# Patient Record
Sex: MALE | Race: WHITE | NOT HISPANIC OR LATINO | Employment: FULL TIME | URBAN - METROPOLITAN AREA
[De-identification: names, ages, dates, MRNs, and addresses within clinical notes are randomized per-mention and may not be internally consistent; named-entity substitution may affect disease eponyms.]

---

## 2017-03-23 ENCOUNTER — OFFICE VISIT (OUTPATIENT)
Dept: AUDIOLOGY | Facility: CLINIC | Age: 27
End: 2017-03-23
Payer: MEDICAID

## 2017-03-23 DIAGNOSIS — H90.72 MIXED HEARING LOSS OF LEFT EAR: ICD-10-CM

## 2017-03-23 DIAGNOSIS — H90.11 CONDUCTIVE HEARING LOSS IN RIGHT EAR: Primary | ICD-10-CM

## 2017-03-23 PROCEDURE — 92567 TYMPANOMETRY: CPT | Performed by: AUDIOLOGIST

## 2017-03-23 PROCEDURE — 92557 COMPREHENSIVE HEARING TEST: CPT | Performed by: AUDIOLOGIST

## 2017-03-23 NOTE — MR AVS SNAPSHOT
"              After Visit Summary   3/23/2017    Mr. Todd Delgado    MRN: [id number]           Patient Information     Date Of Birth          1990        Visit Information        Provider Department      3/23/2017 9:30 AM Romina Luna AuD [facility]        Today's Diagnoses     Conductive hearing loss in right ear    -  1    Mixed hearing loss of left ear           Follow-ups after your visit        Your next 10 appointments already scheduled     Apr 03, 2017  2:15 PM CDT   New Visit with Osmani Valdez MD   [facility] ([facility])    [address] [address]   [phone]              Who to contact     If you have questions or need follow up information about today's clinic visit or your schedule please contact [facility] directly at [phone].  Normal or non-critical lab and imaging results will be communicated to you by MyChart, letter or phone within 4 business days after the clinic has received the results. If you do not hear from us within 7 days, please contact the clinic through MyChart or phone. If you have a critical or abnormal lab result, we will notify you by phone as soon as possible.  Submit refill requests through [employer] or call your pharmacy and they will forward the refill request to us. Please allow 3 business days for your refill to be completed.          Additional Information About Your Visit        MyChart Information     Eldarion lets you send messages to your doctor, view your test results, renew your prescriptions, schedule appointments and more. To sign up, go to [email or web address] . Click on \"Log in\" on the left side of the screen, which will take you to the Welcome page. Then click on \"Sign up Now\" on the right side of the page.     You will be asked to enter the access code listed below, as well as some personal information. Please follow the directions to create your username and password.   "   Your access code is: [email or web address]  Expires: 2017  2:59 PM     Your access code will  in 90 days. If you need help or a new code, please call your [facility] or [phone].        Care EveryWhere ID     This is your Care EveryWhere ID. This could be used by other organizations to access your [place] medical records  [id number]         Blood Pressure from Last 3 Encounters:   16 109/55    Weight from Last 3 Encounters:   No data found for Wt              We Performed the Following     AUDIOGRAM/TYMPANOGRAM - INTERFACE     COMPREHENSIVE HEARING TEST     TYMPANOMETRY        Primary Care Provider Office Phone # Fax #    uJdi KIKO Santos [phone]       [address] [address]        Thank you!     Thank you for choosing [facility]  for your care. Our goal is always to provide you with excellent care. Hearing back from our patients is one way we can continue to improve our services. Please take a few minutes to complete the written survey that you may receive in the mail after your visit with us. Thank you!             Your Updated Medication List - Protect others around you: Learn how to safely use, store and throw away your medicines at www.disposemymeds.org.      Notice  As of 3/23/2017  2:59 PM    You have not been prescribed any medications.

## 2017-03-23 NOTE — PROGRESS NOTES
AUDIOLOGY REPORT    SUBJECTIVE:  Todd Delgado is a 26 year old male who was seen in the Audiology Clinic at [facility] for an audiologic evaluation, referred by Dr. Santos.  No previous audiograms are available at today's appointment.  The patient reports difficulty hearing in groups and with his friends and family. Patient reports a history of left ear surgery about 10 years ago. He states his hearing is much worse in the left ear. The patient denies bilateral tinnitus, bilateral otalgia, bilateral drainage and history of noise exposure.     OBJECTIVE:  Otoscopic exam indicates ears are clear of cerumen bilaterally     Pure Tone Thresholds assessed using conventional audiometry with good  reliability from 250-8000 Hz bilaterally using insert earphones     RIGHT:  moderate conductive hearing loss    LEFT:    severe mixed hearing loss    Tympanogram:    RIGHT: large ear canal volume consistent with tympanic membrane perforation    LEFT:   normal eardrum mobility    Speech Reception Threshold:    RIGHT: 25 dB HL    LEFT:   65 dB HL  Word Recognition Score:     RIGHT: 96% at 65 dB HL using W22 recorded word list.    LEFT:   80% at 80 dB HL using W22 recorded word list.      ASSESSMENT:   Moderate rising to normal sloping to moderate conductive hearing loss in the right ear with a moderately-severe to moderate mixed hearing loss in the left ear.     Today s results were discussed with the patient in detail.     PLAN: It is recommended that the patient be seen by Dr. Valdez for medical evaluation of his ears. He will attempt to get his previous surgery records. Patient was counseled regarding hearing loss and impact on communication. Please call this clinic with questions regarding these results or recommendations.        Romina Luna M.A. OSORIO-AAA  Clinical audiologist [place] [facility] [id number]  3/23/2017

## 2017-05-25 ENCOUNTER — OFFICE VISIT (OUTPATIENT)
Dept: OTOLARYNGOLOGY | Facility: CLINIC | Age: 27
End: 2017-05-25
Payer: COMMERCIAL

## 2017-05-25 VITALS
WEIGHT: 171.25 LBS | TEMPERATURE: 98.1 F | DIASTOLIC BLOOD PRESSURE: 73 MMHG | BODY MASS INDEX: 23.98 KG/M2 | SYSTOLIC BLOOD PRESSURE: 128 MMHG | HEART RATE: 85 BPM | HEIGHT: 71 IN

## 2017-05-25 DIAGNOSIS — H90.2 CONDUCTIVE HEARING LOSS: ICD-10-CM

## 2017-05-25 DIAGNOSIS — H69.93 DYSFUNCTION OF EUSTACHIAN TUBE, BILATERAL: Primary | ICD-10-CM

## 2017-05-25 PROCEDURE — 99203 OFFICE O/P NEW LOW 30 MIN: CPT | Performed by: OTOLARYNGOLOGY

## 2017-05-25 ASSESSMENT — PAIN SCALES - GENERAL: PAINLEVEL: MILD PAIN (2)

## 2017-05-25 NOTE — MR AVS SNAPSHOT
After Visit Summary   5/25/2017    Mr. Todd Delgado    MRN: [id number]           Patient Information     Date Of Birth          1990        Visit Information        Provider Department      5/25/2017 3:30 PM Osmani Valdez MD [facility]        Today's Diagnoses     Dysfunction of eustachian tube, bilateral    -  1    Conductive hearing loss          Care Instructions    Per Physician's instructions            Follow-ups after your visit        Additional Services     OTOLARYNGOLOGY REFERRAL       Your provider has referred you to: [facility]: Adult Ear, Nose [facility] Throat Clinic (Otolaryngology) [facility] [phone]  [email or web address]    Dr. Caicedo or Dr. Allen    Please be aware that coverage of these services is subject to the terms and limitations of your health insurance plan.  Call member services at your health plan with any benefit or coverage questions.      Please bring the following with you to your appointment:    (1) Any X-Rays, CTs or MRIs which have been performed.  Contact the facility where they were done to arrange for  prior to your scheduled appointment.   (2) List of current medications  (3) This referral request   (4) Any documents/labs given to you for this referral                  Who to contact     If you have questions or need follow up information about today's clinic visit or your schedule please contact [facility] directly at [phone].  Normal or non-critical lab and imaging results will be communicated to you by MyChart, letter or phone within 4 business days after the clinic has received the results. If you do not hear from us within 7 days, please contact the clinic through MyChart or phone. If you have a critical or abnormal lab result, we will notify you by phone as soon as possible.  Submit refill requests through [employer] or call your pharmacy and they will forward the refill request  "to us. Please allow 3 business days for your refill to be completed.          Additional Information About Your Visit        Xiamhart Information     Moovit lets you send messages to your doctor, view your test results, renew your prescriptions, schedule appointments and more. To sign up, go to [email or web address] . Click on \"Log in\" on the left side of the screen, which will take you to the Welcome page. Then click on \"Sign up Now\" on the right side of the page.     You will be asked to enter the access code listed below, as well as some personal information. Please follow the directions to create your username and password.     Your access code is: [email or web address]  Expires: 2017  2:59 PM     Your access code will  in 90 days. If you need help or a new code, please call your [place] clinic or [phone].        Care EveryWhere ID     This is your Care EveryWhere ID. This could be used by other organizations to access your [place] medical records  [id number]        Your Vitals Were     Pulse Temperature Height BMI (Body Mass Index)          85 98.1  F (36.7  C) (Oral) 1.803 m (5' 11\") 23.88 kg/m2         Blood Pressure from Last 3 Encounters:   17 128/73   16 109/55    Weight from Last 3 Encounters:   17 77.7 kg (171 lb 4 oz)              We Performed the Following     OTOLARYNGOLOGY REFERRAL        Primary Care Provider Office Phone # Fax #    Judi Santos [phone]       [address] [address]        Thank you!     Thank you for choosing [facility]  for your care. Our goal is always to provide you with excellent care. Hearing back from our patients is one way we can continue to improve our services. Please take a few minutes to complete the written survey that you may receive in the mail after your visit with us. Thank you!             Your Updated Medication List - Protect others around you: Learn how " to safely use, store and throw away your medicines at www.disposemymeds.org.      Notice  As of 5/25/2017  4:14 PM    You have not been prescribed any medications.

## 2017-05-25 NOTE — NURSING NOTE
"Initial /73 (BP Location: Left arm, Patient Position: Chair, Cuff Size: Adult Regular)  Pulse 85  Temp 98.1  F (36.7  C) (Oral)  Ht 1.803 m (5' 11\")  Wt 77.7 kg (171 lb 4 oz)  BMI 23.88 kg/m2 Estimated body mass index is 23.88 kg/(m^2) as calculated from the following:    Height as of this encounter: 1.803 m (5' 11\").    Weight as of this encounter: 77.7 kg (171 lb 4 oz). .    Luz Narayanan CMA    "

## 2017-05-25 NOTE — PROGRESS NOTES
"    History of Present Illness - Todd Delgado is a 26 year old male s/p tympanomastoidectomy on the left for cholesteatoma by an outside ENT in 2009. He did not followup for his second look. He apparently had some ossicles removed. He denies any subsequent otorrhea or hearing changes, but he does find some high pitch sounds very annoying.  He denies bilateral myringotomy tubes in childhood.    Past Medical History -   Patient Active Problem List   Diagnosis     Conductive hearing loss in right ear     Mixed hearing loss of left ear       Current Medications - No current outpatient prescriptions on file.    Allergies -   Allergies   Allergen Reactions     Ceftriaxone Other (See Comments)     Anaphylactic shock       Social History -   Social History     Social History     Marital status:      Spouse name: N/A     Number of children: N/A     Years of education: N/A     Social History Main Topics     Smoking status: Current Some Day Smoker     Types: Other     Smokeless tobacco: None     Alcohol use Yes      Comment: occasional use  no requalr usse per wife      Drug use: None      Comment: uses an E cigerette     Sexual activity: Yes     Other Topics Concern     None     Social History Narrative       Family History - History reviewed. No pertinent family history.    Review of Systems - As per HPI and PMHx, otherwise 7 system review of the head and neck negative. 10+ system review negative.    Physical Exam  /73 (BP Location: Left arm, Patient Position: Chair, Cuff Size: Adult Regular)  Pulse 85  Temp 98.1  F (36.7  C) (Oral)  Ht 1.803 m (5' 11\")  Wt 77.7 kg (171 lb 4 oz)  BMI 23.88 kg/m2  General - The patient is well nourished and well developed, and appears to have good nutritional status.  Alert and oriented to person and place, answers questions and cooperates with examination appropriately.   Head and Face - Normocephalic and atraumatic, with no gross asymmetry noted of the contour of the facial " features.  The facial nerve is intact, with strong symmetric movements.  Voice and Breathing - The patient was breathing comfortably without the use of accessory muscles. There was no wheezing, stridor, or stertor.  The patients voice was clear and strong, and had appropriate pitch and quality.  Ears - Bilateral pinna and EACs with normal appearing overlying skin. Right TM is intact but has a deep retraction pocket in the attic, with a small trail of dried debris, but no keratin. No perforation can be visualized. The left TM is intact without evidence of middle ear disease.  Eyes - Extraocular movements intact.  Sclera were not icteric or injected, conjunctiva were pink and moist.  Mouth - Examination of the oral cavity showed pink, healthy oral mucosa. No lesions or ulcerations noted.  The tongue was mobile and midline, and the dentition were in good condition.    Throat - The walls of the oropharynx were smooth, pink, moist, symmetric, and had no lesions or ulcerations.  The tonsillar pillars and soft palate were symmetric.  The uvula was midline on elevation.  Neck - Normal midline excursion of the laryngotracheal complex during swallowing.  Full range of motion on passive movement.  Palpation of the occipital, submental, submandibular, internal jugular chain, and supraclavicular nodes did not demonstrate any abnormal lymph nodes or masses.  The carotid pulse was palpable bilaterally.  Palpation of the thyroid was soft and smooth, with no nodules or goiter appreciated.  The trachea was mobile and midline.  Nose - External contour is symmetric, no gross deflection or scars.  Nasal mucosa is pink and moist with no abnormal mucus.  The septum was midline and non-obstructive, turbinates of normal size and position.  No polyps, masses, or purulence noted on examination.          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 I recommended he pursue second look and ossiculoplasty as his hearing would likely be improved significantly. I have placed a referral to the [facility], but I encouraged him to first consider returning to his originally operating surgeon, who seems to be pretty facile with otology.  Regarding the right ear, it seems that he might have a perforation in the attic. I do not see any other signs of Eustachian tube dysfunction on exam, or I would have suggested a PE tube today. I recommended he be followed on that side with exams every 6 months or so.      Dr. Osmani Valdez MD  Otolaryngology  [facility]

## 2017-06-15 ENCOUNTER — HOSPITAL ENCOUNTER (EMERGENCY)
Facility: CLINIC | Age: 27
Discharge: HOME | End: 2017-06-15
Attending: FAMILY MEDICINE | Admitting: FAMILY MEDICINE
Payer: COMMERCIAL

## 2017-06-15 VITALS
RESPIRATION RATE: 16 BRPM | TEMPERATURE: 98.3 F | HEIGHT: 71 IN | DIASTOLIC BLOOD PRESSURE: 70 MMHG | OXYGEN SATURATION: 97 % | SYSTOLIC BLOOD PRESSURE: 125 MMHG

## 2017-06-15 DIAGNOSIS — M51.16 LUMBAR DISC DISEASE WITH RADICULOPATHY: ICD-10-CM

## 2017-06-15 PROCEDURE — 99282 EMERGENCY DEPT VISIT SF MDM: CPT | Performed by: FAMILY MEDICINE

## 2017-06-15 PROCEDURE — 99283 EMERGENCY DEPT VISIT LOW MDM: CPT | Mod: Z6 | Performed by: FAMILY MEDICINE

## 2017-06-15 RX ORDER — OXYCODONE AND ACETAMINOPHEN 5; 325 MG/1; MG/1
1-2 TABLET ORAL EVERY 6 HOURS PRN
Qty: 8 TABLET | Refills: 0 | Status: SHIPPED | OUTPATIENT
Start: 2017-06-15 | End: 2017-08-08

## 2017-06-15 RX ORDER — METHYLPREDNISOLONE 4 MG
TABLET, DOSE PACK ORAL
Qty: 21 TABLET | Refills: 0 | Status: SHIPPED | OUTPATIENT
Start: 2017-06-15 | End: 2017-08-08

## 2017-06-15 RX ORDER — CYCLOBENZAPRINE HCL 10 MG
10 TABLET ORAL
Qty: 10 TABLET | Refills: 0 | Status: SHIPPED | OUTPATIENT
Start: 2017-06-15 | End: 2017-08-08

## 2017-06-15 ASSESSMENT — ENCOUNTER SYMPTOMS
SORE THROAT: 0
DYSURIA: 0
WHEEZING: 0
DIARRHEA: 0
PALPITATIONS: 0
VOMITING: 0
SHORTNESS OF BREATH: 0
HEADACHES: 0
FREQUENCY: 0
SINUS PRESSURE: 0
DIAPHORESIS: 0
ABDOMINAL PAIN: 0
BLOOD IN STOOL: 1
CHILLS: 0
CONSTIPATION: 0
NAUSEA: 0
FEVER: 0
COUGH: 0

## 2017-06-15 NOTE — ED PROVIDER NOTES
"  History     Chief Complaint   Patient presents with     Back Pain     started last friday, and was feeling better by monday. was stretching backwardswhen it happened. Has a hx of a lumbar injury. was at work today when he twisted wrong and has severe back pain and numbness in left arm and leg, had an episode of incontinence      HPI  Todd Delgado is a 27 year old male who presents with chronic low back pain since age 19 with fall from skateboarding - hip injury at that time.  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 persistent, constant back pain for years.  last week was stretching backward and suddenly in left arm, left leg - weakness also.  middle and ring finger  paresthesias and left leg last 4 toes paresthesias.  No neck pain.  no change range of motion.   low back chronically painful.    left foot weakness ?dorsiflexion  ?saddle anesthesia  No incontinence or retention of urine or stool          I have reviewed the Medications, Allergies, Past Medical and Surgical History, and Social History in the Epic system.    Allergies:   Allergies   Allergen Reactions     Ceftriaxone Other (See Comments)     Anaphylactic shock         No current facility-administered medications on file prior to encounter.   No current outpatient prescriptions on file prior to encounter.    Patient Active Problem List   Diagnosis     Conductive hearing loss in right ear     Mixed hearing loss of left ear       No past surgical history on file.    Social History   Substance Use Topics     Smoking status: Current Some Day Smoker     Types: Other     Smokeless tobacco: Not on file     Alcohol use Yes      Comment: occasional use  no requalr usse per wife          There is no immunization history on file for this patient.    BMI: Estimated body mass index is 23.88 kg/(m^2) as calculated from the following:    Height as of 5/25/17: 1.803 m (5' 11\").    Weight as of 5/25/17: 77.7 kg (171 lb 4 oz).      Review of Systems " "  Constitutional: Negative for chills, diaphoresis and fever.   HENT: Negative for ear pain, sinus pressure and sore throat.    Eyes: Negative for visual disturbance.   Respiratory: Negative for cough, shortness of breath and wheezing.    Cardiovascular: Negative for chest pain and palpitations.   Gastrointestinal: Positive for blood in stool (with wiping). Negative for abdominal pain, constipation, diarrhea, nausea and vomiting.   Genitourinary: Negative for dysuria, frequency and urgency.   Skin: Negative for rash.   Neurological: Negative for headaches.   All other systems reviewed and are negative.      Physical Exam   BP: 125/70  Heart Rate: 75  Temp: 98.3  F (36.8  C)  Resp: 16  Height: 180.3 cm (5' 11\")  SpO2: 97 %  Physical Exam   Constitutional: He appears distressed.   Cardiovascular: Normal rate.  Exam reveals no gallop and no friction rub.    No murmur heard.  Pulses:       Dorsalis pedis pulses are 2+ on the left side.        Posterior tibial pulses are 2+ on the left side.   Pulmonary/Chest: Effort normal and breath sounds normal. No respiratory distress. He has no wheezes. He has no rales.   Abdominal: He exhibits no distension. There is no tenderness. There is no rebound and no guarding.   Genitourinary: Rectal exam shows anal tone normal.   Musculoskeletal:        Left knee: He exhibits normal range of motion, no swelling, no effusion, no ecchymosis, no deformity, no laceration, no erythema and no bony tenderness.        Left ankle: He exhibits normal range of motion. Achilles tendon exhibits no pain and no defect.        Cervical back: He exhibits normal range of motion and no tenderness.        Lumbar back: He exhibits decreased range of motion, tenderness, bony tenderness and spasm. He exhibits no swelling, no deformity, no laceration and normal pulse.        Left upper leg: He exhibits no tenderness and no swelling.        Left foot: There is decreased range of motion and bony tenderness. There " is no tenderness, normal capillary refill, no deformity and no laceration.   Neurological: He is alert. He displays no tremor. No sensory deficit. He displays no seizure activity. GCS eye subscore is 4. GCS verbal subscore is 5. GCS motor subscore is 6.   Reflex Scores:       Patellar reflexes are 2+ on the right side and 2+ on the left side.       Achilles reflexes are 1+ on the right side and 1+ on the left side.  Skin: No rash noted. He is not diaphoretic.        Gait antalgic, favoring his right side. There is some weakness on dorsiflexion of the great toe.    Normal rectal tone in normal perineal sensation         ED Course     ED Course     Procedures             Critical Care time:  none               Labs Ordered and Resulted from Time of ED Arrival Up to the Time of Departure from the ED - No data to display    Assessments & Plan (with Medical Decision Making)     MDM: Todd Delgado is a 27 year old male who presents with a history of lumbar disc disease but worse currently and with radicular symptoms into the left leg.  He has no genitourinary symptoms other than painful to stool.  He does have dorsiflexion Of the foot weakness but a retained Achilles reflex.  No complete foot drop. Inner thighs are without significant numbness on examination although he notes some sense of this on the inner left thigh by history.  His rectal tone is normal and his perineal sensation is fully intact.  At this point MRI is not indicated but I have requested close follow-up with spine consultation and a  consult is written.  His symptoms are most likely to be lumbar disc - likely central. Specific management is described below. He's given precautions for immediate return. Including Cauda equina  We also discussed that the patient needs to follow up with a primary doctor If pain medication is needed in refill And to help coordinate future care    I have reviewed the nursing notes.    I have reviewed the findings,  diagnosis, plan and need for follow up with the patient.       New Prescriptions    No medications on file       Final diagnoses:   Lumbar disc disease with radiculopathy left - Medrol dose pack.  sulindac in place of ibuprofen.  percocet for breakthrough pain. flexeril for back spasm. lumbar MRI will likely be needed. there were also left arm paresthesias, consider imaging of cervical spine as well for spinal stenosis.  Lidoderm patch on today left low back.  remove in 12 hours if effective then replace with lidocaine 4% patch. establish [facility] primary care for low back.    consulty for spine.  return for signs cauda equina syndrome - incontinence/retention urine/stool, foot drop, inner thigh numbness       6/15/2017   [facility] EMERGENCY DEPARTMENT     Gerardo Claros MD  06/15/17 [id number]

## 2017-06-15 NOTE — ED AVS SNAPSHOT
[facility] Emergency Department    [address] [address]    Phone:  [phone]    Fax:  [phone]                                       Mr. Todd Delgado   MRN: [id number]    Department:  [facility]   Date of Visit:  6/15/2017           Patient Information     Date Of Birth          1990        Your diagnoses for this visit were:     Lumbar disc disease with radiculopathy left Medrol dose pack.  sulindac in place of ibuprofen.  percocet for breakthrough pain. flexeril for back spasm. lumbar MRI will likely be needed. there were also left arm paresthesias, consider imaging of cervical spine as well for spinal stenosis.  Lidoderm patch on today left low back.  remove in 12 hours if effective then replace with lidocaine 4% patch. establish [facility] primary care for low back.    consulty for spine.  return for signs cauda equina syndrome - incontinence/retention urine/stool, foot drop, inner thigh numbness       You were seen by Gerardo Claros MD.      Follow-up Information     Follow up with Judi Santos NP. Schedule an appointment as soon as possible for a visit in 1 week.    Why:  or establish care here    Contact information:    [address] [address]  [phone]          Follow up with [facility].    Specialty:  EMERGENCY MEDICINE    Why:  As needed, If symptoms worsen    Contact information:    [address] [place] [id number] [phone]    Additional information:    The medical center is located at   [address] [place] [address]. (between [address]   [address] in [place], [place] north   of [place]).        Follow up with ortho .    Why:  [id number] phone call        Discharge Instructions         ICD-10-CM    1. Lumbar disc disease with radiculopathy left M51.16     Medrol dose pack.  sulindac in place of ibuprofen.  percocet for breakthrough pain.  flexeril for back spasm. lumbar MRI will likely be needed. there were also left arm paresthesias, consider imaging of cervical spine as well for spinal stenosis.  Lidoderm patch on today left low back.  remove in 12 hours if effective then replace with lidocaine 4% patch. establish [facility] primary care for low back.    consulty for spine.  return for signs cauda equina syndrome - incontinence/retention urine/stool, foot drop, inner thigh numbness         Possible Causes of Low Back or Leg Pain    The symptoms in your back or leg may be due to pressure on a nerve. This pressure may be caused by a damaged disk or by abnormal bone growth. Either way, you may feel pain, burning, tingling, or numbness. If you have pressure on a nerve that connects to the sciatic nerve, pain may shoot down your leg.    Pressure from the disk  Constant wear and tear can weaken a disk over time and cause back pain. The disk can then be damaged by a sudden movement or injury. If its soft center begins to bulge, the disk may press on a nerve. Or the outside of the disk may tear, and the soft center may squeeze through and pinch a nerve.    Pressure from bone  As a disk wears out, the vertebrae right above and below the disk begin to touch. This can put pressure on a nerve. Often, abnormal bone (called bone spurs) grows where the vertebrae rub against each other. This can cause the foramen or the spinal canal to narrow (called stenosis) and press against a nerve.  Date Last Reviewed: 10/4/2015    9907-7554 [employer]. [address] [place] [address], [place] [address]. All rights reserved. This information is not intended as a substitute for professional medical care. Always follow your healthcare professional's instructions.          Future Appointments        Provider Department Dept Phone Center    6/20/2017 3:30 PM HESHAM Zacarias [facility] [phone] [facility]      24 Hour Appointment Hotline       To make  an appointment at any [facility] clinic, call [phone] ([phone]). If you don't have a family doctor or clinic, we will help you find one. [facility] clinics are conveniently located to serve the needs of you and your family.          ED Discharge Orders     ORTHO  REFERRAL       [facility] is referring you to the Orthopedic  Services at [facility].       The  Representative will assist you in the coordination of your Orthopedic and Musculoskeletal Care as prescribed by your physician.    The  Representative will call you within 1 business day to help schedule your appointment, or you may contact the  Representative at:    All areas ~ [phone]     Type of Referral : Spine: Lumbar  **Choose Medical Spine Specialist (unless patient was seen by a Medical Spine Specialist within the past 6 months).**  Surgical Evaluation is advised if the patient presents with one or more of the following red flags: Evidence of Spinal Tumor, Infection or Fracture, Cauda Equina Syndrome, Sudden or Progressive Weakness, Loss of Bowel or Bladder Control, or any other documented emergent neurological condition resulting from a Lumbar Spinal Condition. Medical Spine Specialist        Timeframe requested: Within 2 weeks    Coverage of these services is subject to the terms and limitations of your health insurance plan.  Please call member services at your health plan with any benefit or coverage questions.      If X-rays, CT or MRI's have been performed, please contact the facility where they were done to arrange for , prior to your scheduled appointment.  Please bring this referral request to your appointment and present it to your specialist.                     Review of your medicines      START taking        Dose / Directions Last dose taken    cyclobenzaprine 10 MG tablet   Commonly known as:  FLEXERIL   Dose:  10 mg   Quantity:  10  tablet        Take 1 tablet (10 mg) by mouth nightly as needed for muscle spasms   Refills:  0        methylPREDNISolone 4 MG tablet   Commonly known as:  MEDROL DOSEPAK   Quantity:  21 tablet        Follow package instructions   Refills:  0        oxyCODONE-acetaminophen 5-325 MG per tablet   Commonly known as:  PERCOCET   Dose:  1-2 tablet   Quantity:  8 tablet        Take 1-2 tablets by mouth every 6 hours as needed for moderate to severe pain   Refills:  0                Prescriptions were sent or printed at these locations (3 Prescriptions)                   Other Prescriptions                Printed at Department/Unit printer (3 of 3)         oxyCODONE-acetaminophen (PERCOCET) 5-325 MG per tablet               cyclobenzaprine (FLEXERIL) 10 MG tablet               methylPREDNISolone (MEDROL DOSEPAK) 4 MG tablet                Orders Needing Specimen Collection     None      Pending Results     No orders found from 6/13/2017 to 6/16/2017.            Pending Culture Results     No orders found from 6/13/2017 to 6/16/2017.            Pending Results Instructions     If you had any lab results that were not finalized at the time of your Discharge, you can call the ED Lab Result RN at [phone]. You will be contacted by this team for any positive Lab results or changes in treatment. The nurses are available 7 days a week from 10A to 6:30P.  You can leave a message 24 hours per day and they will return your call.        Test Results From Your Hospital Stay               Thank you for choosing [facility]       Thank you for choosing [facility] for your care. Our goal is always to provide you with excellent care. Hearing back from our patients is one way we can continue to improve our services. Please take a few minutes to complete the written survey that you may receive in the mail after you visit with us. Thank you!        [employer] Information     [employer] lets you send messages to your doctor, view your test results,  "renew your prescriptions, schedule appointments and more. To sign up, go to [email or web address] . Click on \"Log in\" on the left side of the screen, which will take you to the Welcome page. Then click on \"Sign up Now\" on the right side of the page.     You will be asked to enter the access code listed below, as well as some personal information. Please follow the directions to create your username and password.     Your access code is: [email or web address]  Expires: 2017  2:59 PM     Your access code will  in 90 days. If you need help or a new code, please call your [facility] clinic or [phone].        Care EveryWhere ID     This is your Care EveryWhere ID. This could be used by other organizations to access your [facility] medical records  [id number]        After Visit Summary       This is your record. Keep this with you and show to your community pharmacist(s) and doctor(s) at your next visit.                  "

## 2017-06-15 NOTE — ED NOTES
Has HX of low back pain/injury, 2 1/2 years ago, he states he injured his lumbar and was [place] [facility] [place] told to go home and take tylenol. Last Friday he states he had low back pain and left arm, leg numbness after working all day that lasted 3 hours. His pain never really went away but he was able to work and function. Today while at work it happened again with left arm/leg pain numbness with urine urgency. CMS intact. Pt is a/o x 4. Denies cough, SOB, fever/chills, is eating and drinking normal. No new injury

## 2017-06-15 NOTE — DISCHARGE INSTRUCTIONS
ICD-10-CM    1. Lumbar disc disease with radiculopathy left M51.16     Medrol dose pack.  sulindac in place of ibuprofen.  percocet for breakthrough pain. flexeril for back spasm. lumbar MRI will likely be needed. there were also left arm paresthesias, consider imaging of cervical spine as well for spinal stenosis.  Lidoderm patch on today left low back.  remove in 12 hours if effective then replace with lidocaine 4% patch. establish [facility] primary care for low back.    consulty for spine.  return for signs cauda equina syndrome - incontinence/retention urine/stool, foot drop, inner thigh numbness         Possible Causes of Low Back or Leg Pain    The symptoms in your back or leg may be due to pressure on a nerve. This pressure may be caused by a damaged disk or by abnormal bone growth. Either way, you may feel pain, burning, tingling, or numbness. If you have pressure on a nerve that connects to the sciatic nerve, pain may shoot down your leg.    Pressure from the disk  Constant wear and tear can weaken a disk over time and cause back pain. The disk can then be damaged by a sudden movement or injury. If its soft center begins to bulge, the disk may press on a nerve. Or the outside of the disk may tear, and the soft center may squeeze through and pinch a nerve.    Pressure from bone  As a disk wears out, the vertebrae right above and below the disk begin to touch. This can put pressure on a nerve. Often, abnormal bone (called bone spurs) grows where the vertebrae rub against each other. This can cause the foramen or the spinal canal to narrow (called stenosis) and press against a nerve.  Date Last Reviewed: 10/4/2015    7888-7093 [employer]. [address] [place] [address], [place] [address]. All rights reserved. This information is not intended as a substitute for professional medical care. Always follow your healthcare professional's instructions.

## 2017-06-15 NOTE — ED AVS SNAPSHOT
[facility] Emergency Department    [address] [address]    Phone:  [phone]    Fax:  [phone]                                       Mr. Todd Delgado   MRN: [id number]    Department:  [facility] Emergency Department   Date of Visit:  6/15/2017           After Visit Summary Signature Page     I have received my discharge instructions, and my questions have been answered. I have discussed any challenges I see with this plan with the nurse or doctor.    ..........................................................................................................................................  Patient/Patient Representative Signature      ..........................................................................................................................................  Patient Representative Print Name and Relationship to Patient    ..................................................               ................................................  Date                                            Time    ..........................................................................................................................................  Reviewed by Signature/Title    ...................................................              ..............................................  Date                                                            Time

## 2017-06-16 ENCOUNTER — OFFICE VISIT (OUTPATIENT)
Dept: FAMILY MEDICINE | Facility: CLINIC | Age: 27
End: 2017-06-16
Payer: COMMERCIAL

## 2017-06-16 VITALS
HEIGHT: 71 IN | HEART RATE: 72 BPM | DIASTOLIC BLOOD PRESSURE: 64 MMHG | SYSTOLIC BLOOD PRESSURE: 118 MMHG | WEIGHT: 165.6 LBS | BODY MASS INDEX: 23.18 KG/M2

## 2017-06-16 DIAGNOSIS — M54.16 LUMBAR RADICULOPATHY: Primary | ICD-10-CM

## 2017-06-16 PROCEDURE — 99203 OFFICE O/P NEW LOW 30 MIN: CPT | Performed by: FAMILY MEDICINE

## 2017-06-16 NOTE — PATIENT INSTRUCTIONS
Set up apptmt with back specialist.  Schedule the MRI soon.    Continue the same medications for now.

## 2017-06-16 NOTE — MR AVS SNAPSHOT
"              After Visit Summary   6/16/2017    Mr. Todd Delgado    MRN: [id number]           Patient Information     Date Of Birth          1990        Visit Information        Provider Department      6/16/2017 3:20 PM JANAY Nice MD [facility]        Today's Diagnoses     Lumbar radiculopathy    -  1      Care Instructions    Set up apptmt with back specialist.  Schedule the MRI soon.    Continue the same medications for now.          Follow-ups after your visit        Your next 10 appointments already scheduled     Jun 20, 2017  3:30 PM CDT   New Sleep Patient with HESHAM Schmidt   [facility] ([facility])    [address]  [facility] [address]   [phone]              Future tests that were ordered for you today     Open Future Orders        Priority Expected Expires Ordered    MR Lumbar Spine w/o Contrast Routine  6/16/2018 6/16/2017            Who to contact     If you have questions or need follow up information about today's clinic visit or your schedule please contact [facility] directly at [phone].  Normal or non-critical lab and imaging results will be communicated to you by MyChart, letter or phone within 4 business days after the clinic has received the results. If you do not hear from us within 7 days, please contact the clinic through eelusiont or phone. If you have a critical or abnormal lab result, we will notify you by phone as soon as possible.  Submit refill requests through [employer] or call your pharmacy and they will forward the refill request to us. Please allow 3 business days for your refill to be completed.          Additional Information About Your Visit        MyChart Information     [employer] lets you send messages to your doctor, view your test results, renew your prescriptions, schedule appointments and more. To sign up, go to [email or web address] . Click on \"Log in\" on the left side of the " "screen, which will take you to the Welcome page. Then click on \"Sign up Now\" on the right side of the page.     You will be asked to enter the access code listed below, as well as some personal information. Please follow the directions to create your username and password.     Your access code is: [email or web address]  Expires: 2017  2:59 PM     Your access code will  in 90 days. If you need help or a new code, please call your [facility] clinic or [phone].        Care EveryWhere ID     This is your Care EveryWhere ID. This could be used by other organizations to access your [facility] medical records  [id number]        Your Vitals Were     Pulse Height BMI (Body Mass Index)             72 5' 11\" (1.803 m) 23.1 kg/m2          Blood Pressure from Last 3 Encounters:   17 118/64   06/15/17 125/70   17 128/73    Weight from Last 3 Encounters:   17 165 lb 9.6 oz (75.1 kg)   17 171 lb 4 oz (77.7 kg)               Primary Care Provider Office Phone # Fax #    Judi KIKO Santos [phone]       [address] [address]        Thank you!     Thank you for choosing [facility]  for your care. Our goal is always to provide you with excellent care. Hearing back from our patients is one way we can continue to improve our services. Please take a few minutes to complete the written survey that you may receive in the mail after your visit with us. Thank you!             Your Updated Medication List - Protect others around you: Learn how to safely use, store and throw away your medicines at www.disposemymeds.org.          This list is accurate as of: 17  3:59 PM.  Always use your most recent med list.                   Brand Name Dispense Instructions for use    cyclobenzaprine 10 MG tablet    FLEXERIL    10 tablet    Take 1 tablet (10 mg) by mouth nightly as needed for muscle spasms       methylPREDNISolone 4 MG tablet    " MEDROL DOSEPAK    21 tablet    Follow package instructions       oxyCODONE-acetaminophen 5-325 MG per tablet    PERCOCET    8 tablet    Take 1-2 tablets by mouth every 6 hours as needed for moderate to severe pain

## 2017-06-16 NOTE — PROGRESS NOTES
"  SUBJECTIVE:                                                    Todd Delgado is a 27 year old male who presents to clinic today for the following health issues:      ED/UC Followup:    Facility:  [facility]  Date of visit: 6/15/2017  Reason for visit: lumbar disc disease with radiculopathy left  Current Status: Continues to have significant low back pain with radiation down both legs, worse on the left; he is only able to sleep for an hour or 2 at a time without waking up and having to change position            Problem list and histories reviewed & adjusted, as indicated.  Additional history: He was seen in emergency room and a referral was made to the  for medical back pain evaluation. He was also encouraged to come into establish a primary care provider for any ongoing pain medications. They also recommended that he be scheduled for an MRI        Reviewed and updated as needed this visit by clinical staff  Allergies       Reviewed and updated as needed this visit by Provider               ROS:  Constitutional, HEENT, cardiovascular, pulmonary, gi and gu systems are negative, except as otherwise noted.        OBJECTIVE:                                                    /64 (BP Location: Right arm, Patient Position: Chair, Cuff Size: Adult Regular)  Pulse 72  Ht 5' 11\" (1.803 m)  Wt 165 lb 9.6 oz (75.1 kg)  BMI 23.1 kg/m2    GENERAL: healthy, alert and no distress  EYES: Eyes grossly normal to inspection, extraocular movements - intact, and PERRL  NECK: no tenderness, no adenopathy, no asymmetry, no masses, no stiffness; thyroid- normal to palpation  RESP: lungs clear to auscultation - no rales, no rhonchi, no wheezes  CV: regular rates and rhythm, normal S1 S2, no S3 or S4 and no murmur, no click or rub -  MS: Back exam: Normal posture, range of motion about half-normal in all directions, strength and tone normal in the lower extremities; straight leg raising positive bilaterally at " 45   NEURO: strength and tone- normal, sensory exam- grossly normal, mentation- intact, speech- normal, reflexes- symmetric         ASSESSMENT/PLAN:                                                    ASSESSMENT:  1. Lumbar radiculopathy        PLAN:  Orders Placed This Encounter     MR Lumbar Spine w/o Contrast       Patient Instructions   Set up apptmt with back specialist.  Schedule the MRI soon.    Continue the same medications for now.      JANAY Nice  [facility]

## 2017-06-16 NOTE — NURSING NOTE
"Chief Complaint   Patient presents with     Establish Care     get aquainted with Dr. Nice     ER F/U     pt. is here today for follow up on back. pt. was seen in ER on 6/15/2017        Initial /64 (BP Location: Right arm, Patient Position: Chair, Cuff Size: Adult Regular)  Pulse 72  Ht 5' 11\" (1.803 m)  Wt 165 lb 9.6 oz (75.1 kg)  BMI 23.1 kg/m2 Estimated body mass index is 23.1 kg/(m^2) as calculated from the following:    Height as of this encounter: 5' 11\" (1.803 m).    Weight as of this encounter: 165 lb 9.6 oz (75.1 kg).  Medication Reconciliation: complete     Roma Quick, CMA      "

## 2017-06-18 ENCOUNTER — HOSPITAL ENCOUNTER (OUTPATIENT)
Dept: MRI IMAGING | Facility: CLINIC | Age: 27
Discharge: HOME | End: 2017-06-18
Attending: FAMILY MEDICINE | Admitting: FAMILY MEDICINE
Payer: COMMERCIAL

## 2017-06-18 DIAGNOSIS — M54.16 LUMBAR RADICULOPATHY: ICD-10-CM

## 2017-06-18 PROCEDURE — 72148 MRI LUMBAR SPINE W/O DYE: CPT

## 2017-06-19 NOTE — PROGRESS NOTES
Please CALL PATIENT    No significant findings on the MRI. Keep the appointment with the back pain specialist.

## 2017-07-27 ENCOUNTER — OFFICE VISIT (OUTPATIENT)
Dept: FAMILY MEDICINE | Facility: CLINIC | Age: 27
End: 2017-07-27
Payer: COMMERCIAL

## 2017-07-27 VITALS
DIASTOLIC BLOOD PRESSURE: 82 MMHG | HEIGHT: 71 IN | WEIGHT: 173 LBS | SYSTOLIC BLOOD PRESSURE: 121 MMHG | BODY MASS INDEX: 24.22 KG/M2 | TEMPERATURE: 98.3 F | RESPIRATION RATE: 18 BRPM | HEART RATE: 82 BPM

## 2017-07-27 DIAGNOSIS — Z00.00 ROUTINE GENERAL MEDICAL EXAMINATION AT A HEALTH CARE FACILITY: Primary | ICD-10-CM

## 2017-07-27 DIAGNOSIS — G47.30 SLEEP APNEA, UNSPECIFIED TYPE: ICD-10-CM

## 2017-07-27 PROCEDURE — 99395 PREV VISIT EST AGE 18-39: CPT | Performed by: FAMILY MEDICINE

## 2017-07-27 NOTE — NURSING NOTE
"Chief Complaint   Patient presents with     Physical       Initial /82  Pulse 82  Temp 98.3  F (36.8  C)  Resp 18  Ht 5' 11\" (1.803 m)  Wt 173 lb (78.5 kg)  BMI 24.13 kg/m2 Estimated body mass index is 24.13 kg/(m^2) as calculated from the following:    Height as of this encounter: 5' 11\" (1.803 m).    Weight as of this encounter: 173 lb (78.5 kg).  Medication Reconciliation: complete   Linda Banks CMA      "

## 2017-07-27 NOTE — MR AVS SNAPSHOT
After Visit Summary   7/27/2017    Mr. Todd Delgado    MRN: [id number]           Patient Information     Date Of Birth          1990        Visit Information        Provider Department      7/27/2017 3:40 PM JANAY Nice MD [facility]        Today's Diagnoses     Sleep apnea, unspecified type    -  1      Care Instructions      Preventive Health Recommendations  Male Ages 26 - 39    Yearly exam:             See your health care provider every year in order to  o   Review health changes.   o   Discuss preventive care.    o   Review your medicines if your doctor has prescribed any.    You should be tested each year for STDs (sexually transmitted diseases), if you re at risk.     After age 35, talk to your provider about cholesterol testing. If you are at risk for heart disease, have your cholesterol tested at least every 5 years.     If you are at risk for diabetes, you should have a diabetes test (fasting glucose).  Shots: Get a flu shot each year. Get a tetanus shot every 10 years.     Nutrition:    Eat at least 5 servings of fruits and vegetables daily.     Eat whole-grain bread, whole-wheat pasta and brown rice instead of white grains and rice.     Talk to your provider about Calcium and Vitamin D.     Lifestyle    Exercise for at least 150 minutes a week (30 minutes a day, 5 days a week). This will help you control your weight and prevent disease.     Limit alcohol to one drink per day.     No smoking.     Wear sunscreen to prevent skin cancer.     See your dentist every six months for an exam and cleaning.             Follow-ups after your visit        Additional Services     SLEEP EVALUATION & MANAGEMENT REFERRAL - ADULT       Please be aware that coverage of these services is subject to the terms and limitations of your health insurance plan.  Call member services at your health plan with any benefit or coverage questions.      Please bring the following to your  "appointment:    >>   List of current medications   >>   This referral request   >>   Any documents/labs given to you for this referral    [facility] / Lab  Ph [phone] (Age 2 and up)                  Future tests that were ordered for you today     Open Future Orders        Priority Expected Expires Ordered    SLEEP EVALUATION & MANAGEMENT REFERRAL - ADULT Routine  2018            Who to contact     If you have questions or need follow up information about today's clinic visit or your schedule please contact [facility] directly at [phone].  Normal or non-critical lab and imaging results will be communicated to you by Red Swooshhart, letter or phone within 4 business days after the clinic has received the results. If you do not hear from us within 7 days, please contact the clinic through Red Swooshhart or phone. If you have a critical or abnormal lab result, we will notify you by phone as soon as possible.  Submit refill requests through nxtControl or call your pharmacy and they will forward the refill request to us. Please allow 3 business days for your refill to be completed.          Additional Information About Your Visit        MyChart Information     nxtControl lets you send messages to your doctor, view your test results, renew your prescriptions, schedule appointments and more. To sign up, go to [email or web address] . Click on \"Log in\" on the left side of the screen, which will take you to the Welcome page. Then click on \"Sign up Now\" on the right side of the page.     You will be asked to enter the access code listed below, as well as some personal information. Please follow the directions to create your username and password.     Your access code is: [email or web address]  Expires: 10/25/2017  4:04 PM     Your access code will  in 90 days. If you need help or a new code, please call your [facility] or [phone].        Care EveryWhere ID     This is your " "Care EveryWhere ID. This could be used by other organizations to access your [facility] medical records  [id number]        Your Vitals Were     Pulse Temperature Respirations Height BMI (Body Mass Index)       82 98.3  F (36.8  C) 18 5' 11\" (1.803 m) 24.13 kg/m2        Blood Pressure from Last 3 Encounters:   07/27/17 121/82   06/16/17 118/64   06/15/17 125/70    Weight from Last 3 Encounters:   07/27/17 173 lb (78.5 kg)   06/16/17 165 lb 9.6 oz (75.1 kg)   05/25/17 171 lb 4 oz (77.7 kg)               Primary Care Provider    None Specified       No primary provider on file.        Equal Access to Services     ELOISE ANGEL : Naresh Huizar, wapkda lurikkiadaha, qaybta kaalmada elidiayaulises, kelley moreno . So [facility] [phone].    ATENCIÓN: Si habla español, tiene a jefferson disposición servicios gratuitos de asistencia lingüística. Llame al [phone].    We comply with applicable federal civil rights laws and [place] laws. We do not discriminate on the basis of race, color, national origin, age, disability sex, sexual orientation or gender identity.            Thank you!     Thank you for choosing [facility]  for your care. Our goal is always to provide you with excellent care. Hearing back from our patients is one way we can continue to improve our services. Please take a few minutes to complete the written survey that you may receive in the mail after your visit with us. Thank you!             Your Updated Medication List - Protect others around you: Learn how to safely use, store and throw away your medicines at www.disposemymeds.org.          This list is accurate as of: 7/27/17  4:04 PM.  Always use your most recent med list.                   Brand Name Dispense Instructions for use Diagnosis    cyclobenzaprine 10 MG tablet    FLEXERIL    10 tablet    Take 1 tablet (10 mg) by mouth nightly as needed for muscle spasms        methylPREDNISolone 4 MG tablet    " MEDROL DOSEPAK    21 tablet    Follow package instructions        oxyCODONE-acetaminophen 5-325 MG per tablet    PERCOCET    8 tablet    Take 1-2 tablets by mouth every 6 hours as needed for moderate to severe pain

## 2017-07-27 NOTE — PROGRESS NOTES
SUBJECTIVE:   CC: Todd Delgado is an 27 year old male who presents for preventative health visit.     Healthy Habits:    Do you get at least three servings of calcium containing foods daily (dairy, green leafy vegetables, etc.)? yes    Amount of exercise or daily activities, outside of work: 0 day(s) per week    Problems taking medications regularly No    Medication side effects: No    Have you had an eye exam in the past two years? yes    Do you see a dentist twice per year? no    Do you have sleep apnea, excessive snoring or daytime drowsiness?yes      His primary concern is that he has significant daytime drowsiness, no matter on the hours of sleep he got the night before. Fortunately he does not tend to fall asleep while driving, but if he sits in a comfortable chair or relaxes for any length of time he will drift off to sleep. He does not feel rested when he wakes up in the morning. His girlfriend/bed partner is with him today and she states that he is very restless sleeper, will often make chewing noises while he is sleeping and she has observed him having apneic spells. She has not timed him with a watch to see how long they last went some are long enough that she gets concerned and will poke him to get him to start breathing again.          Today's PHQ-2 Score: PHQ-2 ( 1999 Pfizer) 6/16/2017   Q1: Little interest or pleasure in doing things 0   Q2: Feeling down, depressed or hopeless 0   PHQ-2 Score 0         Abuse: Current or Past(Physical, Sexual or Emotional)- No  Do you feel safe in your environment - Yes  Social History   Substance Use Topics     Smoking status: Current Some Day Smoker     Types: Other     Smokeless tobacco: Never Used      Comment: E-CIG      Alcohol use Yes      Comment: occasional use  no requalr usse per wife      The patient does not drink >3 drinks per day nor >7 drinks per week.    Last PSA: No results found for: PSA    Reviewed orders with patient. Reviewed health maintenance  "and updated orders accordingly - Yes      Reviewed and updated as needed this visit by clinical staff  Tobacco  Allergies  Meds         Reviewed and updated as needed this visit by Provider            ROS:  C: NEGATIVE for fever, chills, change in weight  I: NEGATIVE for worrisome rashes, moles or lesions  E: NEGATIVE for vision changes or irritation  ENT: NEGATIVE for ear, mouth and throat problems  R: NEGATIVE for significant cough or SOB  CV: NEGATIVE for chest pain, palpitations or peripheral edema  GI: NEGATIVE for nausea, abdominal pain, heartburn, or change in bowel habits   male: negative for dysuria, hematuria, decreased urinary stream, erectile dysfunction, urethral discharge  M: NEGATIVE for significant arthralgias or myalgia  N: NEGATIVE for weakness, dizziness or paresthesias  P: NEGATIVE for changes in mood or affect    OBJECTIVE:   /82  Pulse 82  Temp 98.3  F (36.8  C)  Resp 18  Ht 5' 11\" (1.803 m)  Wt 173 lb (78.5 kg)  BMI 24.13 kg/m2  EXAM:  GENERAL: healthy, alert and no distress  EYES: Eyes grossly normal to inspection, PERRL and conjunctivae and sclerae normal  HENT: ear canals and TM's normal, nose and mouth without ulcers or lesions  NECK: no adenopathy, no asymmetry, masses, or scars and thyroid normal to palpation  RESP: lungs clear to auscultation - no rales, rhonchi or wheezes  CV: regular rate and rhythm, normal S1 S2, no S3 or S4, no murmur, click or rub, no peripheral edema and peripheral pulses strong  ABDOMEN: soft, nontender, no hepatosplenomegaly, no masses and bowel sounds normal  MS: no gross musculoskeletal defects noted, no edema  SKIN: no suspicious lesions or rashes  NEURO: Normal strength and tone, mentation intact and speech normal  PSYCH: mentation appears normal, affect normal/bright    ASSESSMENT/PLAN:     ASSESSMENT:  1. Routine general medical examination at a health care facility    2. Sleep apnea, unspecified type        PLAN:    His history and " "description of his girlfriend are certainly suggestive of significant sleep apnea. Will make a referral to the sleep clinic    Orders Placed This Encounter     SLEEP EVALUATION & MANAGEMENT REFERRAL - ADULT       Patient Instructions     Preventive Health Recommendations  Male Ages 26 - 39    Yearly exam:             See your health care provider every year in order to  o   Review health changes.   o   Discuss preventive care.    o   Review your medicines if your doctor has prescribed any.    You should be tested each year for STDs (sexually transmitted diseases), if you re at risk.     After age 35, talk to your provider about cholesterol testing. If you are at risk for heart disease, have your cholesterol tested at least every 5 years.     If you are at risk for diabetes, you should have a diabetes test (fasting glucose).  Shots: Get a flu shot each year. Get a tetanus shot every 10 years.     Nutrition:    Eat at least 5 servings of fruits and vegetables daily.     Eat whole-grain bread, whole-wheat pasta and brown rice instead of white grains and rice.     Talk to your provider about Calcium and Vitamin D.     Lifestyle    Exercise for at least 150 minutes a week (30 minutes a day, 5 days a week). This will help you control your weight and prevent disease.     Limit alcohol to one drink per day.     No smoking.     Wear sunscreen to prevent skin cancer.     See your dentist every six months for an exam and cleaning.        COUNSELING:  Reviewed preventive health counseling, as reflected in patient instructions       Regular exercise       Healthy diet/nutrition       Vision screening       Hearing screening         reports that he has been smoking Other.  He has never used smokeless tobacco.  Tobacco Cessation Action Plan: Information offered: Patient not interested at this time  Estimated body mass index is 24.13 kg/(m^2) as calculated from the following:    Height as of this encounter: 5' 11\" (1.803 m).    " Weight as of this encounter: 173 lb (78.5 kg).         Counseling Resources:  ATP IV Guidelines  Pooled Cohorts Equation Calculator  FRAX Risk Assessment  ICSI Preventive Guidelines  Dietary Guidelines for Americans, 2010  USDA's MyPlate  ASA Prophylaxis  Lung CA Screening    JANAY Nice MD  [facility]

## 2017-08-08 ENCOUNTER — OFFICE VISIT (OUTPATIENT)
Dept: SLEEP MEDICINE | Facility: CLINIC | Age: 27
End: 2017-08-08
Payer: COMMERCIAL

## 2017-08-08 ENCOUNTER — OFFICE VISIT (OUTPATIENT)
Dept: SLEEP MEDICINE | Facility: CLINIC | Age: 27
End: 2017-08-08
Attending: FAMILY MEDICINE
Payer: COMMERCIAL

## 2017-08-08 VITALS
WEIGHT: 171.2 LBS | OXYGEN SATURATION: 93 % | HEART RATE: 79 BPM | SYSTOLIC BLOOD PRESSURE: 106 MMHG | DIASTOLIC BLOOD PRESSURE: 62 MMHG | HEIGHT: 70 IN | BODY MASS INDEX: 24.51 KG/M2

## 2017-08-08 DIAGNOSIS — G47.19 EXCESSIVE DAYTIME SLEEPINESS: Primary | ICD-10-CM

## 2017-08-08 DIAGNOSIS — G47.30 SLEEP APNEA, UNSPECIFIED TYPE: ICD-10-CM

## 2017-08-08 DIAGNOSIS — M26.02 MAXILLARY HYPOPLASIA: ICD-10-CM

## 2017-08-08 DIAGNOSIS — R06.83 SNORING: ICD-10-CM

## 2017-08-08 DIAGNOSIS — R06.81 APNEA: ICD-10-CM

## 2017-08-08 DIAGNOSIS — G47.10 HYPERSOMNIA: Primary | ICD-10-CM

## 2017-08-08 LAB
ALBUMIN SERPL-MCNC: 4.1 G/DL (ref 3.4–5)
ALP SERPL-CCNC: 87 U/L (ref 40–150)
ALT SERPL W P-5'-P-CCNC: 25 U/L (ref 0–70)
ANION GAP SERPL CALCULATED.3IONS-SCNC: 5 MMOL/L (ref 3–14)
AST SERPL W P-5'-P-CCNC: 16 U/L (ref 0–45)
BASOPHILS # BLD AUTO: 0.1 10E9/L (ref 0–0.2)
BASOPHILS NFR BLD AUTO: 0.8 %
BILIRUB SERPL-MCNC: 0.4 MG/DL (ref 0.2–1.3)
BUN SERPL-MCNC: 12 MG/DL (ref 7–30)
CALCIUM SERPL-MCNC: 9.4 MG/DL (ref 8.5–10.1)
CHLORIDE SERPL-SCNC: 107 MMOL/L (ref 94–109)
CO2 SERPL-SCNC: 29 MMOL/L (ref 20–32)
CREAT SERPL-MCNC: 0.76 MG/DL (ref 0.66–1.25)
DIFFERENTIAL METHOD BLD: NORMAL
EOSINOPHIL # BLD AUTO: 0.2 10E9/L (ref 0–0.7)
EOSINOPHIL NFR BLD AUTO: 3.3 %
ERYTHROCYTE [DISTWIDTH] IN BLOOD BY AUTOMATED COUNT: 13.3 % (ref 10–15)
GFR SERPL CREATININE-BSD FRML MDRD: NORMAL ML/MIN/1.7M2
GLUCOSE SERPL-MCNC: 90 MG/DL (ref 70–99)
HCT VFR BLD AUTO: 49.3 % (ref 40–53)
HGB BLD-MCNC: 16.2 G/DL (ref 13.3–17.7)
LYMPHOCYTES # BLD AUTO: 2.4 10E9/L (ref 0.8–5.3)
LYMPHOCYTES NFR BLD AUTO: 36.4 %
MCH RBC QN AUTO: 29.3 PG (ref 26.5–33)
MCHC RBC AUTO-ENTMCNC: 32.9 G/DL (ref 31.5–36.5)
MCV RBC AUTO: 89 FL (ref 78–100)
MONOCYTES # BLD AUTO: 0.6 10E9/L (ref 0–1.3)
MONOCYTES NFR BLD AUTO: 9.7 %
NEUTROPHILS # BLD AUTO: 3.3 10E9/L (ref 1.6–8.3)
NEUTROPHILS NFR BLD AUTO: 49.8 %
PLATELET # BLD AUTO: 267 10E9/L (ref 150–450)
POTASSIUM SERPL-SCNC: 4 MMOL/L (ref 3.4–5.3)
PROT SERPL-MCNC: 7.4 G/DL (ref 6.8–8.8)
RBC # BLD AUTO: 5.52 10E12/L (ref 4.4–5.9)
SODIUM SERPL-SCNC: 141 MMOL/L (ref 133–144)
TSH SERPL DL<=0.005 MIU/L-ACNC: 0.91 MU/L (ref 0.4–4)
WBC # BLD AUTO: 6.6 10E9/L (ref 4–11)

## 2017-08-08 PROCEDURE — 84443 ASSAY THYROID STIM HORMONE: CPT | Performed by: FAMILY MEDICINE

## 2017-08-08 PROCEDURE — 80050 GENERAL HEALTH PANEL: CPT | Performed by: FAMILY MEDICINE

## 2017-08-08 PROCEDURE — 99244 OFF/OP CNSLTJ NEW/EST MOD 40: CPT | Performed by: FAMILY MEDICINE

## 2017-08-08 PROCEDURE — 80053 COMPREHEN METABOLIC PANEL: CPT | Performed by: FAMILY MEDICINE

## 2017-08-08 PROCEDURE — 36415 COLL VENOUS BLD VENIPUNCTURE: CPT | Performed by: FAMILY MEDICINE

## 2017-08-08 NOTE — MR AVS SNAPSHOT
After Visit Summary   8/8/2017    Mr. Todd Delgado    MRN: [id number]           Patient Information     Date Of Birth          1990        Visit Information        Provider Department      8/8/2017 10:00 AM SLEEP LAB, BED FIVE [facility]        Today's Diagnoses     Excessive daytime sleepiness    -  1       Follow-ups after your visit        Your next 10 appointments already scheduled     Aug 08, 2017 10:00 AM CDT   Actigraphy [facility] with [facility]    [id number]  [facility] [address]   [phone]            Aug 23, 2017  8:00 PM CDT   PSG Diagnostic/MSLT with SLEEP LAB, BED [facility]   [facility]    [address]  [facility] [address]   [phone]            Sep 07, 2017  8:00 AM CDT   [facility] Sleep Patient with Atul Abraham MD   [facility]    [address]  [facility] [address]   [phone]              Who to contact     If you have questions or need follow up information about today's clinic visit or your schedule please contact [facility] directly at [phone].  Normal or non-critical lab and imaging results will be communicated to you by MyChart, letter or phone within 4 business days after the clinic has received the results. If you do not hear from us within 7 days, please contact the clinic through MyChart or phone. If you have a critical or abnormal lab result, we will notify you by phone as soon as possible.  Submit refill requests through [employer] or call your pharmacy and they will forward the refill request to us. Please allow 3 business days for your refill to be completed.          Additional Information About Your Visit        TravelTrianglehart Information     [employer] lets you send messages to your doctor, view your test results, renew your  "prescriptions, schedule appointments and more. To sign up, go to [email or web address] . Click on \"Log in\" on the left side of the screen, which will take you to the Welcome page. Then click on \"Sign up Now\" on the right side of the page.     You will be asked to enter the access code listed below, as well as some personal information. Please follow the directions to create your username and password.     Your access code is: [email or web address]  Expires: 10/25/2017  4:04 PM     Your access code will  in 90 days. If you need help or a new code, please call your [facility] clinic or [phone].        Care EveryWhere ID     This is your Care EveryWhere ID. This could be used by other organizations to access your [facility] medical records  [id number]         Blood Pressure from Last 3 Encounters:   17 106/62   17 121/82   17 118/64    Weight from Last 3 Encounters:   17 77.7 kg (171 lb 3.2 oz)   17 78.5 kg (173 lb)   17 75.1 kg (165 lb 9.6 oz)              Today, you had the following     No orders found for display       Primary Care Provider    None Specified       No primary provider on file.        Equal Access to Services     ELOISE Reg[facility] MeridianJANAY : Hadii emily nguyen hadasho Soomaali, waaxda luqadaha, qaybta kaalmada adeegyada, kelley moreno . So [facility] [phone].    ATENCIÓN: Si habla español, tiene a jefferson disposición servicios gratuitos de asistencia lingüística. Llame al [phone].    We comply with applicable federal civil rights laws and [place] laws. We do not discriminate on the basis of race, color, national origin, age, disability sex, sexual orientation or gender identity.            Thank you!     Thank you for choosing [facility]  for your care. Our goal is always to provide you with excellent care. Hearing back from our patients is one way we can continue to improve our services. Please take a few minutes to complete the " written survey that you may receive in the mail after your visit with us. Thank you!             Your Updated Medication List - Protect others around you: Learn how to safely use, store and throw away your medicines at www.disposemymeds.org.      Notice  As of 8/8/2017  9:52 AM    You have not been prescribed any medications.

## 2017-08-08 NOTE — NURSING NOTE
"Chief Complaint   Patient presents with     Sleep Problem     consult; referred by Dr. Nice. fatigue; breathing patterns are irregular during sleep; doesn't feel that he goes in deep sleep.        Initial /62  Pulse 79  Ht 1.778 m (5' 10\")  Wt 77.7 kg (171 lb 3.2 oz)  SpO2 93%  BMI 24.56 kg/m2 Estimated body mass index is 24.56 kg/(m^2) as calculated from the following:    Height as of this encounter: 1.778 m (5' 10\").    Weight as of this encounter: 77.7 kg (171 lb 3.2 oz).  Medication Reconciliation: complete   "

## 2017-08-08 NOTE — PROGRESS NOTES
"  Sleep Consultation:    Date on this visit: 8/8/2017    Todd Delgado is sent by JANAY Nice for a sleep consultation regarding hypersomnia.    Primary Physician: No primary care provider on file.     CC: \"I am always tired.\"    HPI:  Todd Delgado is a tired appearing 28 yo M with history of left cholesteatoma resection who presents for severe daytime fatigue.  He is joined by his wife today.    Todd notes concerns with sleepiness or fatigue since high school.  When asked if he had trouble staying awake in classes, mentions \"more like what classes I didn't fall asleep in\".  But, currently they feel his symptoms have worsened over the past 2-3 years.  His wife notes a multiple year history of loud snoring, observed apnea.  Todd denies any inappropriate sleepiness during the day, but his wife notes his driving has gotten worse and frequently hits rumble strips / takes very sharp turns.  Rarely can make it through a movie without falling asleep.    She has also noticed an increase in ability to sleep 12+ hours on a weekend night and still seem tired the next day.  She notes he seems to have much less ambition.    On work days, goes to bed at 11pm and falls asleep in about 1 minutes.  May awaken 3-4x to roll over, falls back asleep very quickly.  Up by alarm at 6am.  On weekends, goes to bed around midnight and can sleep to noon or later if not awoken.    Todd denies any sleep walking, dream enactment, sleep paralysis, cataplexy and hypnogogic/hypnopompic hallucinations.    Patient's [place] Sleepiness score 18/24 consistent with excessive  daytime sleepiness.      FHx:  Maternal grandmother and sister with very loud snoring  Mother with increased total sleep time, though on psychiatric medications    SHx:  , lives with wife and two roommates.  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  Caffeine use of ~40oz of coffee per day and ~24oz of energy drinks.  Uses e-cigarette.  Denies regular " alcohol use due to worsening sleepiness.  Denies other drug use.    Allergies:    Allergies   Allergen Reactions     Ceftriaxone Other (See Comments)     Anaphylactic shock       Medications:    No current outpatient prescriptions on file.       Problem List:  Patient Active Problem List    Diagnosis Date Noted     Conductive hearing loss in right ear 03/23/2017     Priority: Medium     Mixed hearing loss of left ear 03/23/2017     Priority: Medium     Chronic low back pain 06/28/2016     Priority: Medium     Last Assessment & Plan:   A: Patient with history of acute on chronic low back pain that has been worse since Monday after injury at work. Currently taking Medrol Dosepak and tramadol for pain management prescribed by the ED. Notes ongoing pain today. No red flags or acutely concerning symptoms at this time. Physical exam shows lower lumbar tenderness to palpation. Discussed that pain is likely related to inflammation of a spine disk and would anticipate improvement with finishing medrol dosepak and taking NSAIDs. Patient expressed an understanding of this.    P:  - Continue Medrol Dosepak and Tramadol as prescribed  - Refilled Tramadol #20 QID PRN, advised patient to not to drive while taking this medication  - Recommended ibuprofen 600 mg QID PRN   - Work note provided to patient   - Advised patient to avoid heavy lifting  - Follow-up in 1 month, sooner if pain persists/worsens and can consider imaging at that time          Past Medical/Surgical History:  No past medical history on file.  No past surgical history on file.    Social History:  Social History     Social History     Marital status:      Spouse name: N/A     Number of children: N/A     Years of education: N/A     Occupational History     Not on file.     Social History Main Topics     Smoking status: Current Some Day Smoker     Types: Other     Smokeless tobacco: Never Used      Comment: E-CIG      Alcohol use Yes      Comment: occasional  use  no requalr usse per wife      Drug use: Not on file      Comment: uses an E cigerette     Sexual activity: Yes     Other Topics Concern     Not on file     Social History Narrative       Family History:  No family history on file.    Review of Systems:  A complete review of systems reviewed by me is negative with the exeption of what has been mentioned in the history of present illness.  CONSTITUTIONAL: NEGATIVE for weight gain/loss, fever, chills, sweats or night sweats, drug allergies.  EYES: NEGATIVE for changes in vision, blind spots, double vision.  ENT: NEGATIVE for ear pain, sore throat, sinus pain, post-nasal drip, runny nose, bloody nose  CARDIAC:  POSITIVE for  chest pain and chest pressure  NEUROLOGIC:  POSITIVE for  headaches and weakness or numbness in the arms or legs  DERMATOLOGIC:  POSITIVE for  new mole(s)  PULMONARY: NEGATIVE SOB at rest, SOB with activity, dry cough, productive cough, coughing up blood, wheezing or whistling when breathing.    GASTROINTESTINAL:  POSITIVE for  bowel movements black in color and blood in stool  GENITOURINARY: NEGATIVE for pain during urination, blood in urine, urinating more frequently than usual, irregular menstrual periods.  MUSCULOSKELETAL:  POSITIVE for  bone or joint pain and swollen joints  ENDOCRINE:  POSITIVE for  increased thirst and increased urination  LYMPHATIC: NEGATIVE for swollen lymph nodes, lumps or bumps in the breasts or nipple discharge.    Physical Examination:  Vitals: There were no vitals taken for this visit.  BMI= There is no height or weight on file to calculate BMI.         [place] Total Score 8/8/2017   Total score - [place] 18       GENERAL APPEARANCE: healthy, alert, no distress and fatigued  HENT: nose and mouth without ulcers or lesions and maxillary insufficiency noted  NECK: no adenopathy, no asymmetry, masses, or scars and thyroid normal to palpation  RESP: lungs clear to auscultation - no rales, rhonchi or wheezes  CV:  regular rates and rhythm, normal S1 S2, no S3 or S4 and no murmur, click or rub  PSYCH: mentation appears normal and affect normal    Impression/Plan:    Todd Delgado is a tired appearing 26 yo M with history of left cholesteatoma resection who presents for hypersomnia.    1.)  Hypersomnia   - Appears to report inappropriate sleepiness since high school, but most pronounced over the past 2-3 years.   - High likelihood for MARK given snoring, observed apnea, hypersomnia, male gender, as well as significant maxillary insufficiency.   - Negative narcolepsy triad, though significant hypersomnia since high school and reported ability to sleep 12+ hours on routine basis.   - Given this, will perform actigraphy x 2 weeks linked to PSG and possible MSLT if AHI < 15.  Overall, still suspect will present as significant MARK given maxillary insufficiency.    Plan as given to patient as above.    I have spent 60 minutes with this patient today in which greater than 50% of this time was spent in the counseling / coordination of care regarding MARK, hypersomnia.    Polysomnography reviewed.  Obstructive sleep apnea reviewed.  Complications of untreated sleep apnea were reviewed.    Atul Abraham MD    CC: JANAY Nice

## 2017-08-08 NOTE — PATIENT INSTRUCTIONS

## 2017-08-08 NOTE — MR AVS SNAPSHOT
After Visit Summary   8/8/2017    Mr. Todd Delgado    MRN: [id number]           Patient Information     Date Of Birth          1990        Visit Information        Provider Department      8/8/2017 8:30 AM Atul Abraham MD [facility]        Today's Diagnoses     Hypersomnia    -  1    Sleep apnea, unspecified type        Snoring        Apnea        Maxillary hypoplasia          Care Instructions      Your BMI is Body mass index is 24.56 kg/(m^2).  Weight management is a personal decision.  If you are interested in exploring weight loss strategies, the following discussion covers the approaches that may be successful. Body mass index (BMI) is one way to tell whether you are at a healthy weight, overweight, or obese. It measures your weight in relation to your height.  A BMI of 18.5 to 24.9 is in the healthy range. A person with a BMI of 25 to 29.9 is considered overweight, and someone with a BMI of 30 or greater is considered obese. More than two-thirds of American adults are considered overweight or obese.  Being overweight or obese increases the risk for further weight gain. Excess weight may lead to heart disease and diabetes.  Creating and following plans for healthy eating and physical activity may help you improve your health.  Weight control is part of healthy lifestyle and includes exercise, emotional health, and healthy eating habits. Careful eating habits lifelong are the mainstay of weight control. Though there are significant health benefits from weight loss, long-term weight loss with diet alone may be very difficult to achieve- studies show long-term success with dietary management in less than 10% of people. Attaining a healthy weight may be especially difficult to achieve in those with severe obesity. In some cases, medications, devices and surgical management might be considered.  What can you do?  If you are overweight or obese and are interested in  methods for weight loss, you should discuss this with your provider.     Consider reducing daily calorie intake by 500 calories.     Keep a food journal.     Avoiding skipping meals, consider cutting portions instead.    Diet combined with exercise helps maintain muscle while optimizing fat loss. Strength training is particularly important for building and maintaining muscle mass. Exercise helps reduce stress, increase energy, and improves fitness. Increasing exercise without diet control, however, may not burn enough calories to loose weight.       Start walking three days a week 10-20 minutes at a time    Work towards walking thirty minutes five days a week     Eventually, increase the speed of your walking for 1-2 minutes at time    In addition, we recommend that you review healthy lifestyles and methods for weight loss available through the National Institutes of Health patient information sites:  http://win.niddk.nih.gov/publications/index.htm    And look into health and wellness programs that may be available through your health insurance provider, employer, local community center, or chayito club.    Weight management plan: Patient was referred to their PCP to discuss a diet and exercise plan.             Follow-ups after your visit        Follow-up notes from your care team     Return if symptoms worsen or fail to improve.      Your next 10 appointments already scheduled     Aug 23, 2017  8:00 PM CDT   PSG Diagnostic[facility]MSLT with [facility]EP [facility]    [address]  [facility] [address]   [phone]            Sep 07, 2017  8:00 AM CDT   Return Sleep Patient with Atul Abraham MD   [facility]    [address]  [facility] [address]   [phone]              Future tests that were ordered for you today     Open Future Orders        Priority Expected Expires Ordered     "Comprehensive Sleep Study Routine  2018            Who to contact     If you have questions or need follow up information about today's clinic visit or your schedule please contact [facility] directly at [phone].  Normal or non-critical lab and imaging results will be communicated to you by MyChart, letter or phone within 4 business days after the clinic has received the results. If you do not hear from us within 7 days, please contact the clinic through [employer] or phone. If you have a critical or abnormal lab result, we will notify you by phone as soon as possible.  Submit refill requests through [employer] or call your pharmacy and they will forward the refill request to us. Please allow 3 business days for your refill to be completed.          Additional Information About Your Visit        [employer] Information     [employer] lets you send messages to your doctor, view your test results, renew your prescriptions, schedule appointments and more. To sign up, go to [email or web address] . Click on \"Log in\" on the left side of the screen, which will take you to the Welcome page. Then click on \"Sign up Now\" on the right side of the page.     You will be asked to enter the access code listed below, as well as some personal information. Please follow the directions to create your username and password.     Your access code is: [email or web address]  Expires: 10/25/2017  4:04 PM     Your access code will  in 90 days. If you need help or a new code, please call your [facility] or [phone].        Care EveryWhere ID     This is your Care EveryWhere ID. This could be used by other organizations to access your [facility] medical records  [id number]        Your Vitals Were     Pulse Height Pulse Oximetry BMI (Body Mass Index)          79 1.778 m (5' 10\") 93% 24.56 kg/m2         Blood Pressure from Last 3 Encounters:   17 106/62   17 121/82   17 118/64    Weight from Last " 3 Encounters:   08/08/17 77.7 kg (171 lb 3.2 oz)   07/27/17 78.5 kg (173 lb)   06/16/17 75.1 kg (165 lb 9.6 oz)              We Performed the Following     CBC with platelets differential     Comprehensive metabolic panel     SLEEP EVALUATION & MANAGEMENT REFERRAL - ADULT     TSH with free T4 reflex        Primary Care Provider    None Specified       No primary provider on file.        Equal Access to Services     [facility] : Hadii emily samaniegoo Gaye, wapkda kendal, chungta kaalmada sarai, kelley moreno . So [facility] [phone].    ATENCIÓN: Si habla español, tiene a jefferson disposición servicios gratuitos de asistencia lingüística. Llame al [phone].    We comply with applicable federal civil rights laws and [place] laws. We do not discriminate on the basis of race, color, national origin, age, disability sex, sexual orientation or gender identity.            Thank you!     Thank you for choosing [facility]  for your care. Our goal is always to provide you with excellent care. Hearing back from our patients is one way we can continue to improve our services. Please take a few minutes to complete the written survey that you may receive in the mail after your visit with us. Thank you!             Your Updated Medication List - Protect others around you: Learn how to safely use, store and throw away your medicines at www.disposemymeds.org.      Notice  As of 8/8/2017 10:52 AM    You have not been prescribed any medications.

## 2017-08-08 NOTE — LETTER
[facility]  [address]  [facility] [address]  Phone: [phone]    August 10, 2017      Todd Delgado  [address] [place] [address]              Lena Delgado,     Good news, the TSH (thyroid test), metabolic panel and blood count tests returned normal. Please feel free to call if you have any questions. [phone]    Sincerely,   Eyad Abraham MD       Component      Latest Ref Rng & Units 8/8/2017   WBC      4.0 - 11.0 10e9/L 6.6   RBC Count      4.4 - 5.9 10e12/L 5.52   Hemoglobin      13.3 - 17.7 g/dL 16.2   Hematocrit      40.0 - 53.0 % 49.3   MCV      78 - 100 fl 89   MCH      26.5 - 33.0 pg 29.3   MCHC      31.5 - 36.5 g/dL 32.9   RDW      10.0 - 15.0 % 13.3   Platelet Count      150 - 450 10e9/L 267   Diff Method       Automated Method   % Neutrophils      % 49.8   % Lymphocytes      % 36.4   % Monocytes      % 9.7   % Eosinophils      % 3.3   % Basophils      % 0.8   Absolute Neutrophil      1.6 - 8.3 10e9/L 3.3   Absolute Lymphocytes      0.8 - 5.3 10e9/L 2.4   Absolute Monocytes      0.0 - 1.3 10e9/L 0.6   Absolute Eosinophils      0.0 - 0.7 10e9/L 0.2   Absolute Basophils      0.0 - 0.2 10e9/L 0.1   Sodium      133 - 144 mmol/L 141   Potassium      3.4 - 5.3 mmol/L 4.0   Chloride      94 - 109 mmol/L 107   Carbon Dioxide      20 - 32 mmol/L 29   Anion Gap      3 - 14 mmol/L 5   Glucose      70 - 99 mg/dL 90   Urea Nitrogen      7 - 30 mg/dL 12   Creatinine      0.66 - 1.25 mg/dL 0.76   GFR Estimate      >60 mL/min/1.7m2 >90 . . .   GFR Estimate If Black      >60 mL/min/1.7m2 >90 . . .   Calcium      8.5 - 10.1 mg/dL 9.4   Bilirubin Total      0.2 - 1.3 mg/dL 0.4   Albumin      3.4 - 5.0 g/dL 4.1   Protein Total      6.8 - 8.8 g/dL 7.4   Alkaline Phosphatase      40 - 150 U/L 87   ALT      0 - 70 U/L 25   AST      0 - 45 U/L 16   TSH      0.40 - 4.00 mU/L 0.91

## 2017-08-23 ENCOUNTER — THERAPY VISIT (OUTPATIENT)
Dept: SLEEP MEDICINE | Facility: CLINIC | Age: 27
End: 2017-08-23
Payer: COMMERCIAL

## 2017-08-23 DIAGNOSIS — M26.02 MAXILLARY HYPOPLASIA: ICD-10-CM

## 2017-08-23 DIAGNOSIS — G47.10 HYPERSOMNIA: ICD-10-CM

## 2017-08-23 DIAGNOSIS — R06.83 SNORING: ICD-10-CM

## 2017-08-23 DIAGNOSIS — R06.81 APNEA: ICD-10-CM

## 2017-08-23 PROCEDURE — 95810 POLYSOM 6/> YRS 4/> PARAM: CPT | Performed by: FAMILY MEDICINE

## 2017-08-23 NOTE — MR AVS SNAPSHOT
After Visit Summary   8/23/2017    Mr. Todd Delgado    MRN: [id number]           Patient Information     Date Of Birth          1990        Visit Information        Provider Department      8/23/2017 8:00 PM SLEEP LAB, BED THREE [facility]        Today's Diagnoses     Hypersomnia        Maxillary hypoplasia        Apnea        Snoring          Care Instructions    Pt arrived at [facility] Sleep lab for sleep study.  Diagnostic PSG completed per provider order.  Patient did not meet criteria for PAP therapy.  MEDICATIONS: No known   STUDY TYPE:  NPSG/MSLT  SLEEP AID: n/a  PAP ACCLIMATION: good, trialed the wisp, brevida and f-10  RESPIRATORY EVENTS: rare obstructive apnea, hypopnea mixed apnea and central events resulting in an AHI <5  ECG: Baseline Heart Rate= 68 BPM/NSR  SPO2=baseline= 98%, Hector=94%  SNORING: moderate to loud with nearly every breath  TCO2 MONITORING: n/a  SUPPLEMENTAL 02= n/a  HOB ELEVATION=flat  TITRATION: n/a  LEAK RATE=n/a  SLEEP STAGES: Stages one, two, three and REM lateral/ supine  MOVEMENT: minimal            Follow-ups after your visit        Your next 10 appointments already scheduled     Aug 24, 2017  7:00 AM CDT   Mslt with SLEEP LAB[facility] BED [facility]    [address]  [facility] [address]   [phone]            Sep 07, 2017  8:00 AM CDT   Return Sleep Patient with Atul Abraham MD   [facility]    [address]  [facility] [address]   [phone]              Who to contact     If you have questions or need follow up information about today's clinic visit or your schedule please contact [facility] directly at [phone].  Normal or non-critical lab and imaging results will be communicated to you by MyChart, letter or phone within 4 business days after the clinic has received the results. If you do not  "hear from us within 7 days, please contact the clinic through [employer] or phone. If you have a critical or abnormal lab result, we will notify you by phone as soon as possible.  Submit refill requests through [employer] or call your pharmacy and they will forward the refill request to us. Please allow 3 business days for your refill to be completed.          Additional Information About Your Visit        [employer] Information     [employer] lets you send messages to your doctor, view your test results, renew your prescriptions, schedule appointments and more. To sign up, go to [email or web address] . Click on \"Log in\" on the left side of the screen, which will take you to the Welcome page. Then click on \"Sign up Now\" on the right side of the page.     You will be asked to enter the access code listed below, as well as some personal information. Please follow the directions to create your username and password.     Your access code is: [email or web address]  Expires: 10/25/2017  4:04 PM     Your access code will  in 90 days. If you need help or a new code, please call your [facility] clinic or [phone].        Care EveryWhere ID     This is your Care EveryWhere ID. This could be used by other organizations to access your [facility] medical records  [id number]         Blood Pressure from Last 3 Encounters:   17 106/62   17 121/82   17 118/64    Weight from Last 3 Encounters:   17 77.7 kg (171 lb 3.2 oz)   17 78.5 kg (173 lb)   17 75.1 kg (165 lb 9.6 oz)              We Performed the Following     Comprehensive Sleep Study        Primary Care Provider    None Specified       No primary provider on file.        Equal Access to Services     [facility] : Naresh Huizar, john edmonds, monica moon, kelley dhillon. So [facility] [phone].    ATENCIÓN: Si habla español, tiene a jefferson disposición servicios gratuitos de asistencia lingüística. " Noam monroy [phone].    We comply with applicable federal civil rights laws and [place] laws. We do not discriminate on the basis of race, color, national origin, age, disability sex, sexual orientation or gender identity.            Thank you!     Thank you for choosing [facility]  for your care. Our goal is always to provide you with excellent care. Hearing back from our patients is one way we can continue to improve our services. Please take a few minutes to complete the written survey that you may receive in the mail after your visit with us. Thank you!             Your Updated Medication List - Protect others around you: Learn how to safely use, store and throw away your medicines at www.disposemymeds.org.      Notice  As of 8/23/2017 11:59 PM    You have not been prescribed any medications.

## 2017-08-24 ENCOUNTER — THERAPY VISIT (OUTPATIENT)
Dept: SLEEP MEDICINE | Facility: CLINIC | Age: 27
End: 2017-08-24
Payer: COMMERCIAL

## 2017-08-24 DIAGNOSIS — D72.10 EOSINOPHILIA: Primary | ICD-10-CM

## 2017-08-24 LAB
AMPHETAMINES UR QL: NOT DETECTED NG/ML
BARBITURATES UR QL SCN: NOT DETECTED NG/ML
BENZODIAZ UR QL SCN: NOT DETECTED NG/ML
BUPRENORPHINE UR QL: NOT DETECTED NG/ML
CANNABINOIDS UR QL: NOT DETECTED NG/ML
COCAINE UR QL SCN: NOT DETECTED NG/ML
D-METHAMPHET UR QL: NOT DETECTED NG/ML
METHADONE UR QL SCN: NOT DETECTED NG/ML
OPIATES UR QL SCN: NOT DETECTED NG/ML
OXYCODONE UR QL SCN: NOT DETECTED NG/ML
PCP UR QL SCN: NOT DETECTED NG/ML
PROPOXYPH UR QL: NOT DETECTED NG/ML
TRICYCLICS UR QL SCN: NOT DETECTED NG/ML

## 2017-08-24 PROCEDURE — 80306 DRUG TEST PRSMV INSTRMNT: CPT | Performed by: FAMILY MEDICINE

## 2017-08-24 PROCEDURE — 95805 MULTIPLE SLEEP LATENCY TEST: CPT | Performed by: FAMILY MEDICINE

## 2017-08-24 NOTE — PATIENT INSTRUCTIONS
Pt arrived at [facility] Sleep lab for sleep study.  Diagnostic PSG completed per provider order.  Patient did not meet criteria for PAP therapy.  MEDICATIONS: No known   STUDY TYPE:  NPSG/MSLT  SLEEP AID: n/a  PAP ACCLIMATION: good, trialed the wisp, brevida and f-10  RESPIRATORY EVENTS: rare obstructive apnea, hypopnea mixed apnea and central events resulting in an AHI <5  ECG: Baseline Heart Rate= 68 BPM/NSR  SPO2=baseline= 98%, Hector=94%  SNORING: moderate to loud with nearly every breath  TCO2 MONITORING: n/a  SUPPLEMENTAL 02= n/a  HOB ELEVATION=flat  TITRATION: n/a  LEAK RATE=n/a  SLEEP STAGES: Stages one, two, three and REM lateral/ supine  MOVEMENT: minimal

## 2017-08-31 NOTE — PROCEDURES
" SLEEP STUDY INTERPRETATION  POLYSOMNOGRAPHY REPORT      Patient: RADHA KAPADIA  YOB: 1990  Study Date: 8/23/2017  MRN: [id number]  Referring Provider: Phillip Nice MD  Ordering Provider: Atul Abraham MD    Indications for Polysomnography: The patient is a 27 y old Male who is 5' 10\" and weighs 171.0 lbs.  His BMI is 24.8, [place] sleepiness scale 18.0 and neck size is 41.0.  A diagnostic polysomnogram was performed to evaluate for excessive daytime sleepiness with snoring, observed apnea,  significant maxillary insufficiency.    Polysomnogram Data:  A full night polysomnogram recorded the standard physiologic parameters including EEG, EOG, EMG, ECG, nasal and oral airflow.  Respiratory parameters of chest and abdominal movements were recorded with respiratory inductance plethysmography.  Oxygen saturation was recorded by pulse oximetry.      Sleep Architecture: alpha intrusions are noted  The total recording time of the polysomnogram was 496.6 minutes.  The total sleep time was 462.5 minutes.  Sleep latency was normal at 13.9 minutes without the use of a sleep aid.  REM latency was 91.5 minutes.  Arousal index was normal at 15.6 arousals per hour.  Sleep efficiency was normal at 93.1%.  Wake after sleep onset was 19.0 minutes.  The patient spent 5.1% of total sleep time in Stage N1, 53.8% in Stage N2, 18.5% in Stages N3, and 22.6% in REM.  Time in REM supine was 73.5 minutes.    Respiration:    Events - The polysomnogram revealed a presence of 1 obstructive, 8 central, and 1 mixed apneas resulting in an apnea index of 1.3 events per hour.  There were 0 hypopneas resulting in a hypopnea index of 0 events per hour.  The combined apnea/hypopnea index was 1.3 events per hour.  The REM AHI was 2.3 events per hour.  The supine AHI was 0.9 events per hour.  The RERA index was 2.2 events per hour.   The RDI was 3.5 events per hour.    Snoring - was reported as moderate to loud.    Respiratory rate and " "pattern - was notable for normal respiratory rate and pattern.    Sustained Sleep Associated Hypoventilation - Transcutaneous carbon dioxide monitoring was not used    Sleep Associated Hypoxemia - (Greater than 5 minutes O2 sat below 89%) was not present.  Baseline oxygen saturation was 96.5%. Lowest oxygen saturation was 92.7%.    3.5 2.2 1.3     Movement Activity:     Periodic Limb Activity - There were 8 PLMs during the entire study. The PLM index was 1.0 movements per hour.  The PLM Arousal Index was 0.4 per hour.    REM EMG Activity - Excessive transient / sustained muscle activity was not present.    Nocturnal Behavior - Abnormal sleep related behaviors were not noted     Bruxism - was suggested     Cardiac Summary:   The average pulse rate was 52.3 bpm.  The minimum pulse rate was 42.9 bpm while the maximum pulse rate was 91.0 bpm. The rhythm is normal sinus. Arrhythmias were not noted.      Assessment:     No evidence of sleep disordered breathing    Alpha intrusions are of uncertain significance     Recommendations:    Patient will stay for MSLT    Advise regarding the risks of drowsy driving.    Suggest optimizing sleep schedule and avoiding sleep deprivation.        _____________________________________   Rj Sunshine MD 8/31/17             Range(%) Time in range (min) Time in range (%) Time in or below range (min) Time in or below range (%)   0.0 - 89.0 - - - -   0.0 - 88.0 - - - -      1.3 0.9 2.3         MSLT/MWT REPORT          Patient Name: RADHA KAPADIA Study Date: 8/24/2017   YOB: 1990 Study Type: MSLT   Age:  27 y MRN: [id number]   Sex: Male Interp Physician: [email or web address]   BMI:  24.8 Ordering Physician: Atul Abraham MD   Height: 5' 10\" Referring Physician: Phillip Nice MD   Weight: 171.0 lbs Recording Tech: EDWARD Martino   [place]: 18.0 Neck Size (cm): 41.0 Scoring Tech: EDWARD Martino   Nap Summary       Nap 1 Nap 2 Nap 3 Nap 4 Nap 5 Average   Lights Off 08:08:37 AM 10:04:14 AM " 12:03:27 PM 02:03:24 PM - -   Lights On 08:38:22 AM 10:36:10 AM 12:32:35 PM 02:24:04 PM - -   Time In Bed 29.8 31.9 29.1 20.7 - 27.9   Sleep Time 15.5 14.2 14.6 - - 11.1   Sleep Efficiency 52.1% 44.4% 50.1% 0.0% - 39.7%   Sleep Onset 08:19:29 AM 10:16:59 AM 12:16:59 PM - - -   Sleep Latency 10.9 12.8 13.5 20.7 - 14.4   REM Onset - - - - - -   REM Sleep Onset Latency - - - - - -                   Recording / Sleep Tech Comments  This was a 4 nap MSLT.  Patient slept for the first 3 naps, no sleep on nap 4 and no REM sleep in any nap.  Patient stated he was always tired and was moderately - very tired before all naps.  Light snoring in naps.                  Physician Interpretation    Mean Sleep Latency is normal at 14.4 minutes  No sleep onset REM periods are noted.  Urine drug screen is negative    ____________________________________________________  Rj Sunshine MD 8/31/17

## 2017-09-07 ENCOUNTER — OFFICE VISIT (OUTPATIENT)
Dept: SLEEP MEDICINE | Facility: CLINIC | Age: 27
End: 2017-09-07
Payer: COMMERCIAL

## 2017-09-07 VITALS
DIASTOLIC BLOOD PRESSURE: 71 MMHG | WEIGHT: 171 LBS | HEART RATE: 83 BPM | HEIGHT: 70 IN | SYSTOLIC BLOOD PRESSURE: 108 MMHG | OXYGEN SATURATION: 97 % | BODY MASS INDEX: 24.48 KG/M2

## 2017-09-07 DIAGNOSIS — G47.33 OSA (OBSTRUCTIVE SLEEP APNEA): Primary | ICD-10-CM

## 2017-09-07 PROCEDURE — 95803 ACTIGRAPHY TESTING: CPT | Performed by: FAMILY MEDICINE

## 2017-09-07 PROCEDURE — 99214 OFFICE O/P EST MOD 30 MIN: CPT | Performed by: FAMILY MEDICINE

## 2017-09-07 NOTE — PATIENT INSTRUCTIONS
"Overall, we did not see evidence of sleep apnea nor narcolepsy.  But, the actigraphy (the watch test looking at sleep time) and this showed a likely longer than average sleep need of potential 10-12+ hours.  When we see this, we use the diagnosis of \"idiopathic hypersomnia with long sleep time\".  In this case, I feel it is reasonable to use a daytime medication to decrease fatigue / sleepiness.  We discussed the medication called Modafinil (Provigil) and we will start a prior approval today.    Your Body mass index is 24.54 kg/(m^2).  Weight management is a personal decision.  If you are interested in exploring weight loss strategies, the following discussion covers the approaches that may be successful. Body mass index (BMI) is one way to tell whether you are at a healthy weight, overweight, or obese. It measures your weight in relation to your height.  A BMI of 18.5 to 24.9 is in the healthy range. A person with a BMI of 25 to 29.9 is considered overweight, and someone with a BMI of 30 or greater is considered obese. More than two-thirds of American adults are considered overweight or obese.  Being overweight or obese increases the risk for further weight gain. Excess weight may lead to heart disease and diabetes.  Creating and following plans for healthy eating and physical activity may help you improve your health.  Weight control is part of healthy lifestyle and includes exercise, emotional health, and healthy eating habits. Careful eating habits lifelong are the mainstay of weight control. Though there are significant health benefits from weight loss, long-term weight loss with diet alone may be very difficult to achieve- studies show long-term success with dietary management in less than 10% of people. Attaining a healthy weight may be especially difficult to achieve in those with severe obesity. In some cases, medications, devices and surgical management might be considered.  What can you do?  If you are " overweight or obese and are interested in methods for weight loss, you should discuss this with your provider.     Consider reducing daily calorie intake by 500 calories.     Keep a food journal.     Avoiding skipping meals, consider cutting portions instead.    Diet combined with exercise helps maintain muscle while optimizing fat loss. Strength training is particularly important for building and maintaining muscle mass. Exercise helps reduce stress, increase energy, and improves fitness. Increasing exercise without diet control, however, may not burn enough calories to loose weight.       Start walking three days a week 10-20 minutes at a time    Work towards walking thirty minutes five days a week     Eventually, increase the speed of your walking for 1-2 minutes at time    In addition, we recommend that you review healthy lifestyles and methods for weight loss available through the National Institutes of Health patient information sites:  http://win.niddk.nih.gov/publications/index.htm    And look into health and wellness programs that may be available through your health insurance provider, employer, local community center, or [employer] club.

## 2017-09-07 NOTE — MR AVS SNAPSHOT
"              After Visit Summary   9/7/2017    Mr. Todd Delgado    MRN: [id number]           Patient Information     Date Of Birth          1990        Visit Information        Provider Department      9/7/2017 8:00 AM Atul Abraham MD [facility]        Care Instructions    Overall, we did not see evidence of sleep apnea nor narcolepsy.  But, the actigraphy (the watch test looking at sleep time) and this showed a likely longer than average sleep need of potential 10-12+ hours.  When we see this, we use the diagnosis of \"idiopathic hypersomnia with long sleep time\".  In this case, I feel it is reasonable to use a daytime medication to decrease fatigue / sleepiness.  We discussed the medication called Modafinil (Provigil) and we will start a prior approval today.          Follow-ups after your visit        Who to contact     If you have questions or need follow up information about today's clinic visit or your schedule please contact [facility] directly at [phone].  Normal or non-critical lab and imaging results will be communicated to you by [email or web address], letter or phone within 4 business days after the clinic has received the results. If you do not hear from us within 7 days, please contact the clinic through [employer] or phone. If you have a critical or abnormal lab result, we will notify you by phone as soon as possible.  Submit refill requests through [employer] or call your pharmacy and they will forward the refill request to us. Please allow 3 business days for your refill to be completed.          Additional Information About Your Visit        [employer] Information     [employer] lets you send messages to your doctor, view your test results, renew your prescriptions, schedule appointments and more. To sign up, go to [email or web address] . Click on \"Log in\" on the left side of the screen, which will take you to the Welcome page. Then click on \"Sign up Now\" on the " "right side of the page.     You will be asked to enter the access code listed below, as well as some personal information. Please follow the directions to create your username and password.     Your access code is: [email or web address]  Expires: 10/25/2017  4:04 PM     Your access code will  in 90 days. If you need help or a new code, please call your [facility] clinic or [phone].        Care EveryWhere ID     This is your Care EveryWhere ID. This could be used by other organizations to access your [facility] medical records  [id number]        Your Vitals Were     Pulse Height Pulse Oximetry BMI (Body Mass Index)          83 1.778 m (5' 10\") 97% 24.54 kg/m2         Blood Pressure from Last 3 Encounters:   17 108/71   17 106/62   17 121/82    Weight from Last 3 Encounters:   17 77.6 kg (171 lb)   17 77.7 kg (171 lb 3.2 oz)   17 78.5 kg (173 lb)              Today, you had the following     No orders found for display       Primary Care Provider    None Specified       No primary provider on file.        Equal Access to Services     LETTY ANGEL : Hadii emily Huizar, wapkda kendal, qaybta kaalmada adevickida, kelley moreno . So [facility] [phone].    ATENCIÓN: Si habla español, tiene a jefferson disposición servicios gratuitos de asistencia lingüística. Llame al [phone].    We comply with applicable federal civil rights laws and [place] laws. We do not discriminate on the basis of race, color, national origin, age, disability sex, sexual orientation or gender identity.            Thank you!     Thank you for choosing [facility]  for your care. Our goal is always to provide you with excellent care. Hearing back from our patients is one way we can continue to improve our services. Please take a few minutes to complete the written survey that you may receive in the mail after your visit with us. Thank you!             Your " Updated Medication List - Protect others around you: Learn how to safely use, store and throw away your medicines at www.disposemymeds.org.      Notice  As of 9/7/2017  8:47 AM    You have not been prescribed any medications.

## 2017-09-07 NOTE — NURSING NOTE
"Chief Complaint   Patient presents with     Study Results     sleep study, MSLT follow up       Initial /71  Pulse 83  Ht 1.778 m (5' 10\")  Wt 77.6 kg (171 lb)  SpO2 97%  BMI 24.54 kg/m2 Estimated body mass index is 24.54 kg/(m^2) as calculated from the following:    Height as of this encounter: 1.778 m (5' 10\").    Weight as of this encounter: 77.6 kg (171 lb).  Medication Reconciliation: complete    "

## 2017-09-08 PROBLEM — G47.11 IDIOPATHIC HYPERSOMNIA WITH LONG SLEEP TIME: Status: ACTIVE | Noted: 2017-09-08

## 2017-09-08 NOTE — PROGRESS NOTES
"  Chief Complaint   Patient presents with     Study Results     sleep study, MSLT follow up       Todd Delgado is a 27 year old male who returns to [facility] for review of sleep testing results. He presented with chronic daytime fatigue versus hypersomnia.    Estimated body mass index is 24.54 kg/(m^2) as calculated from the following:    Height as of this encounter: 1.778 m (5' 10\").    Weight as of this encounter: 77.6 kg (171 lb).  Total score - [place]: 18 (8/8/2017  8:00 AM)    Actigraphy performed 8/7/2017 - 8/23/2017, sleep diaries were completed.  Average sleep time during weekdays of ~8-9 hours, but seen to be often 12+ hours on weekends.  No significant daytime napping noted.  Overall, low daytime activity seen on weekends.  Overall, average total sleep time of 570 minutes.    PSG performed 8/23/2017 with AHI 1.3, martir SpO2 92.7%, baseline SpO2 96.5%.  PLM index of 1/hour.  Total sleep time of 462.5 minutes.    MSLT performed 8/24/2017, 4 naps (10.9, 12.8, 13.5, 20) with mean sleep latency of 14.3 minutes and no sleep onset REM's.  Urine toxicology was negative.    Problem List:  Patient Active Problem List    Diagnosis Date Noted     Conductive hearing loss in right ear 03/23/2017     Priority: Medium     Mixed hearing loss of left ear 03/23/2017     Priority: Medium     Chronic low back pain 06/28/2016     Priority: Medium     Last Assessment & Plan:   A: Patient with history of acute on chronic low back pain that has been worse since Monday after injury at work. Currently taking Medrol Dosepak and tramadol for pain management prescribed by the ED. Notes ongoing pain today. No red flags or acutely concerning symptoms at this time. Physical exam shows lower lumbar tenderness to palpation. Discussed that pain is likely related to inflammation of a spine disk and would anticipate improvement with finishing medrol dosepak and taking NSAIDs. Patient expressed an understanding of " "this.    P:  - Continue Medrol Dosepak and Tramadol as prescribed  - Refilled Tramadol #20 QID PRN, advised patient to not to drive while taking this medication  - Recommended ibuprofen 600 mg QID PRN   - Work note provided to patient   - Advised patient to avoid heavy lifting  - Follow-up in 1 month, sooner if pain persists/worsens and can consider imaging at that time          /71  Pulse 83  Ht 1.778 m (5' 10\")  Wt 77.6 kg (171 lb)  SpO2 97%  BMI 24.54 kg/m2    Impression/Plan:    - No significant sleep disordered breathing  - Actigraphy suggestive of very long sleep need    While his MSL was equivocal, his history of reported hypersomnia since teenage years and documented prolonged sleep need, I feel best fits a diagnosis of idiopathic hypersomnia with long sleep time.    We discussed the importance of continuing to allow for adequate total sleep time, avoidance of alcohol and other chemicals.  I feel it is reasonable to consider a trial of a wakefulness promotor or stimulant medication.  Will try for prior authorization for Modafinil 100-200mg PO QAM.  Otherwise, would consider methylphenidate 5-10mg PO TID PRN.    He will follow up with me in about 1 month(s).     Twenty-five minutes spent with patient, all of which were spent face-to-face counseling, consulting, coordinating plan of care.      Atul Abraham MD, MD    CC:  No primary care provider on file. (only for reference, does not automatically route).  "

## 2017-09-13 ENCOUNTER — TELEPHONE (OUTPATIENT)
Dept: SLEEP MEDICINE | Facility: CLINIC | Age: 27
End: 2017-09-13

## 2017-09-13 DIAGNOSIS — G47.11 IDIOPATHIC HYPERSOMNIA WITH LONG SLEEP TIME: Primary | ICD-10-CM

## 2017-09-13 NOTE — TELEPHONE ENCOUNTER
SUBJECTIVE:  Chief Complaint   Patient presents with     Medication Request     Modafinil      OBJECTIVE:  Todd called requesting a update on the prescription status. I don't see that a prescription was entered.   Last office visit: 09/07/2017    ASSESSMENT/PLAN:  Please review.

## 2017-09-14 ENCOUNTER — TELEPHONE (OUTPATIENT)
Dept: FAMILY MEDICINE | Facility: CLINIC | Age: 27
End: 2017-09-14

## 2017-09-14 RX ORDER — MODAFINIL 200 MG/1
TABLET ORAL
Qty: 30 TABLET | Refills: 5 | Status: SHIPPED | OUTPATIENT
Start: 2017-09-14 | End: 2017-10-24

## 2017-09-14 NOTE — TELEPHONE ENCOUNTER
**Please Do Not Close This Encounter**               ** Until This Has Been Addressed**          PRIOR AUTHORIZATION REQUIED PER INSURANCE       PATIENT: Todd Delgado YOB: 1990          MEDICATION:Modafinl 200                    SIG: Take 1/2 - 1 tablet by mouth in the morning.  Start with 1/2 tablet, ok to increase to full tablet after 1               week if residual sleepiness.       NDC:[address]-77      INSURANCE:  [employer] [facility]      INSURANCE PHONE #: [phone]      ID #: [id number]      INSURANCE REJECT:PA REQUIRED.PLEASE FAX -[phone]      PHARMACY: [facility]      PHARMACY NPI: [id number]      PHARMACY PHONE #: [phone] #2                                                          Please let us know when Prior Auth approved/denied, prescriber refusal to complete prior auth or if you would like to change medication/discontinue                                                            Thank you

## 2017-09-26 NOTE — TELEPHONE ENCOUNTER
Cover my meds.Status   Sent to [employer] September 19   Next Steps   To follow up, call the plan at [phone]  I called the number provided by cover my meds and was informed that it generally takes 4-5 business days however it can be up to 10 days. Today is the seventh day. When they arrive at a determination they will fax it to the number provided on the form.     Todd informed of message via telephone call.

## 2017-09-29 NOTE — TELEPHONE ENCOUNTER
Received a fax from [facility]:   This is a notice of: Denial of service  This service is denied because: your medication was denied because you did not meet medical criteria. The review of this request was based on the Nuvigil/Provigil Prior Authorization program. It cannot be approved at this time. This was based on the information from your prescriber. In order to approve this request, program requirements must be met. You must have a condition approved for this drug. The following are approved conditions for this drug: -Narcolepsy - Obstructive sleep apnea - Shift work sleep disorder - Fatigue related to multiple sclerosis. These are different types of conditions affecting sleep. If not, you must have another condition that is approved by the Food and Drug Administration (FDA) for this drug. This decision is based on [place] law[place] 9505.0220 For questions or to ask if there are exceptions to this decision, please contact member services at [phone] or toll free at [phone].    Form sent to scanning.   Please review. Would you like to prescribe an alternate med?

## 2017-10-03 DIAGNOSIS — G47.11 IDIOPATHIC HYPERSOMNIA WITH LONG SLEEP TIME: Primary | ICD-10-CM

## 2017-10-03 RX ORDER — METHYLPHENIDATE HYDROCHLORIDE 20 MG/1
TABLET ORAL
Qty: 60 TABLET | Refills: 0 | Status: SHIPPED | OUTPATIENT
Start: 2017-10-03 | End: 2017-10-24

## 2017-10-03 NOTE — TELEPHONE ENCOUNTER
Todd left a message on clinic voicemail. He is interested in an alternate medication.   I tried to call him back and got his voicemail.

## 2017-10-03 NOTE — TELEPHONE ENCOUNTER
1 month of rx hand carried to pharmacy. He will call in a few weeks to let us know how he is doing.

## 2017-10-24 ENCOUNTER — TELEPHONE (OUTPATIENT)
Dept: SLEEP MEDICINE | Facility: CLINIC | Age: 27
End: 2017-10-24

## 2017-10-24 DIAGNOSIS — G47.11 IDIOPATHIC HYPERSOMNIA WITH LONG SLEEP TIME: ICD-10-CM

## 2017-10-24 NOTE — TELEPHONE ENCOUNTER
We can try an increase to 3 tablets to use per day, and could use 2 in the morning and one in the early afternoon.

## 2017-10-24 NOTE — TELEPHONE ENCOUNTER
Received an incoming call from Todd regarding   Current Outpatient Prescriptions   Medication     methylphenidate (RITALIN) 20 MG tablet     Sig: Take 1/2 - 1 tablet by mouth twice daily as needed.    Todd says that when he was taking the medication as directed: one in the am and one in the pm he wouldn't notice a change/difference is symptoms. He switched to taking two together in the morning and this did help for a few hours but then would be tired again in the afternoon.     He is in need of a refill prescription and is wondering if there is any advise regarding symptoms.

## 2017-10-26 RX ORDER — METHYLPHENIDATE HYDROCHLORIDE 20 MG/1
TABLET ORAL
Qty: 120 TABLET | Refills: 0 | Status: SHIPPED | OUTPATIENT
Start: 2017-10-26 | End: 2017-10-31

## 2017-10-26 NOTE — TELEPHONE ENCOUNTER
I am hesitantly comfortable with the dosing of 2 tablets QAM and 2 tablets in the PM as needed.  I am not sure if we will encounter a quantity limit, but will write rx for #120.

## 2017-10-26 NOTE — TELEPHONE ENCOUNTER
Todd returned call and informed me that he took his last tablet this morning. He agrees to taking the medication sig 2 in the am and 1 in the early pm but feels that he would like to be able to take 2 tablets in the am and 2 tablets in the pm as needed. Please review.     Todd would like prescription walked to pharmacist at [facility] and would like a call when complete.

## 2017-10-26 NOTE — TELEPHONE ENCOUNTER
Prescription given to pharmacist at [facility].   Todd informed of message via telephone call.

## 2017-10-26 NOTE — TELEPHONE ENCOUNTER
Chief Complaint   Patient presents with     Medication Question     Ritalin      Refill request received via phone by patient     Pending Prescriptions:                       Disp   Refills    methylphenidate (RITALIN) 20 MG tablet    90 tab*0            Sig: Take 2 tablets by mouth in the morning and 1           tablet in the early afternoon         Last Written Prescription Date:  10/03/2017  Last Fill Quantity: 60 per 30 days,   # refills: 0  Last Office Visit with prescribing provider: 09/07/2017  Future Office visit:       Routing refill request to provider for review/approval because:  Drug not on the [facility], P or [facility] refill protocol or controlled substance       Todd would only have 12 tablets left after today. If he is to take new SIG it will only last him 4 days. Please review pending medication.

## 2017-10-31 ENCOUNTER — TELEPHONE (OUTPATIENT)
Dept: SLEEP MEDICINE | Facility: CLINIC | Age: 27
End: 2017-10-31

## 2017-10-31 DIAGNOSIS — G47.11 IDIOPATHIC HYPERSOMNIA WITH LONG SLEEP TIME: ICD-10-CM

## 2017-10-31 RX ORDER — METHYLPHENIDATE HYDROCHLORIDE 20 MG/1
TABLET ORAL
Qty: 90 TABLET | Refills: 0 | Status: SHIPPED | OUTPATIENT
Start: 2017-10-31 | End: 2017-12-01

## 2017-10-31 NOTE — TELEPHONE ENCOUNTER
Todd called and states he would rather not wait to get the determination of the Prior Authorization. He would like to fill a prescription 3 tablets daily.   Dr. Abraham; can you review and sign a new prescription with the new sig? Todd would like to  medication today.

## 2017-10-31 NOTE — TELEPHONE ENCOUNTER
Insurance will only cover a maximum of 3 tablets daily, prior authorization is required.     Insurance: [employer]  ID: [id number]  Phone Number: [phone]    Thank You-  Lida Dukes, [facility] [place]

## 2017-10-31 NOTE — TELEPHONE ENCOUNTER
Prescription given to Jasson pharmacist at [facility]. Informed to void prescription dated 10/26/17

## 2017-11-06 NOTE — TELEPHONE ENCOUNTER
I contacted the number provided by pharmacy.   Finished request through cover my meds.   .Todd Delgado (Jimenez: GTEBAY) - [phone]   .     improved

## 2017-12-01 DIAGNOSIS — G47.11 IDIOPATHIC HYPERSOMNIA WITH LONG SLEEP TIME: ICD-10-CM

## 2017-12-01 NOTE — TELEPHONE ENCOUNTER
Chief Complaint   Patient presents with     Refill Request     methylphenidate (RITALIN) 20 MG tablet      Refill request received via phone by patient    Pending Prescriptions:                       Disp   Refills    methylphenidate (RITALIN) 20 MG tablet    90 tab*0            Sig: Take 2 tablets by mouth in the morning and 1           tablet in the early afternoon         Last Written Prescription Date:  10/31/2017  Last Fill Quantity: 90 per 30 days,   # refills: 0  Last Office Visit with prescribing provider: 09/07/2017  Future Office visit:       Routing refill request to provider for review/approval because:  Drug not on the [facility], P or [facility] refill protocol or controlled substance

## 2017-12-04 RX ORDER — METHYLPHENIDATE HYDROCHLORIDE 20 MG/1
TABLET ORAL
Qty: 90 TABLET | Refills: 0 | Status: SHIPPED | OUTPATIENT
Start: 2017-12-29 | End: 2018-04-13 | Stop reason: ALTCHOICE

## 2017-12-04 RX ORDER — METHYLPHENIDATE HYDROCHLORIDE 20 MG/1
TABLET ORAL
Qty: 90 TABLET | Refills: 0 | Status: SHIPPED | OUTPATIENT
Start: 2017-12-04 | End: 2018-04-13 | Stop reason: ALTCHOICE

## 2017-12-04 RX ORDER — METHYLPHENIDATE HYDROCHLORIDE 20 MG/1
TABLET ORAL
Qty: 90 TABLET | Refills: 0 | Status: SHIPPED | OUTPATIENT
Start: 2018-02-01 | End: 2018-04-13 | Stop reason: ALTCHOICE

## 2018-01-09 ENCOUNTER — TELEPHONE (OUTPATIENT)
Dept: SLEEP MEDICINE | Facility: CLINIC | Age: 28
End: 2018-01-09

## 2018-01-09 NOTE — TELEPHONE ENCOUNTER
Given MA, the only other option likely available would be adderall IR.  I am hesitantly comfortable with trial of Adderall 20mg PO BID to TID PRN, and would discontinue the methylphenidate.

## 2018-01-09 NOTE — TELEPHONE ENCOUNTER
Called and explained in detail the information below. He wants to call his wife and see what his out of pocket cost will be. He is no longer on [employer] MA. I told him he needs to get his insurance updated at the clinic and at the pharmacy. He will call back. If he wants the RX for Adderall the methylphenidate Rx's will need to be shredded at [facility]. The Adderall is to replace the Methylphenidate

## 2018-01-09 NOTE — TELEPHONE ENCOUNTER
Per patient is looking for a medication refill for methylphenidate. Patient also states that he is looking to set up an appt with Dr. Abraham to discuss medication since it has not been working quite as well as it used to be; however patient cannot schedule at this time since patient insurance is having issues at this time. Patient would like a call to discuss this matter. Please call patient at [phone].    Outreach ,  Jessica Kyle

## 2018-01-09 NOTE — TELEPHONE ENCOUNTER
I called Todd and he said that the Methylphenidate is not working as well as it has. He wants to discuss other options.  He is currently taking 2 in the am and one at noon. He is open to other medications. He is MA and has quantity limits.  He only has enough left for tomorrow. Please let us know what you recommend. He has RXs for the 20 mg at the [facility] pharmacy

## 2018-01-10 NOTE — TELEPHONE ENCOUNTER
Todd left a message on clinic voicemail. He would like to go ahead and try adderall. Please advise.

## 2018-01-11 RX ORDER — DEXTROAMPHETAMINE SACCHARATE, AMPHETAMINE ASPARTATE, DEXTROAMPHETAMINE SULFATE AND AMPHETAMINE SULFATE 5; 5; 5; 5 MG/1; MG/1; MG/1; MG/1
TABLET ORAL
Qty: 90 TABLET | Refills: 0 | Status: SHIPPED | OUTPATIENT
Start: 2018-01-11 | End: 2018-03-06

## 2018-01-11 NOTE — TELEPHONE ENCOUNTER
Todd informed of message via telephone call.  Todd requested I walk prescription to [facility]. Informed Jasson at [facility] to shred (on hold) prescriptions for Ritalin.

## 2018-01-24 ENCOUNTER — TELEPHONE (OUTPATIENT)
Dept: FAMILY MEDICINE | Facility: CLINIC | Age: 28
End: 2018-01-24

## 2018-01-24 NOTE — TELEPHONE ENCOUNTER
Patient has new insurance ([place] Medical Assistance) and they do not cover Adderall w/ a diagnosis of Hypersomnia-I believe it needs to be with a dx code for ADHD. A prior authorization would need to be done. Please advise    INS: [place] Medicaid  ID: [id number]  #: [phone]    Marleni Casas CPhT  [facility]  [phone]

## 2018-01-26 NOTE — TELEPHONE ENCOUNTER
Attempted to contact the number provided by pharmacy.   Was on hold for 10+ minutes. Spoke with Emmanuel and she informed me that they do not initiate Prior Authorization's at this phone number. I should call \[facility] [place] [facility]\"" at [phone]

## 2018-01-26 NOTE — TELEPHONE ENCOUNTER
Todd called the clinic for clarification on why a Prior Authorization is being requested. He informed me that he will have straight MA for one month only.  I informed Todd of current status of request and that determination results may take a few days.

## 2018-01-26 NOTE — TELEPHONE ENCOUNTER
I called [phone] option 5 to initiate a Prior Authorization. Spoke with Eliza. She will fax a form to our office at [phone].

## 2018-01-26 NOTE — TELEPHONE ENCOUNTER
Form was filled out and faxed to [facility]  Tel:[phone] Fax:[phone]

## 2018-01-30 NOTE — TELEPHONE ENCOUNTER
Received a fax from [facility]  Message: 009 - This service may be provided without authorization.  Copy given to pharmacist at [facility]     Copy sent to scanning.

## 2018-03-06 NOTE — TELEPHONE ENCOUNTER
Chief Complaint   Patient presents with     Refill Request     amphetamine-dextroamphetamine (ADDERALL) 20 MG per tablet      Refill request received via phone by patient   [facility] [address] tel:[phone]    Todd would like prescription(s) given to [facility] Pharmacist.    Pending Prescriptions:                       Disp   Refills    amphetamine-dextroamphetamine (ADDERALL) *90 tab*0            Sig: Take 1 tablet by mouth three times daily as           needed for sleepiness.         Last Written Prescription Date:  01/11/2018  Last Fill Quantity: 90 per 30 days,   # refills: 0  Last Office Visit with prescribing provider: 09/07/2017  Future Office visit:       Routing refill request to provider for review/approval because:  Drug not on the [facility], [facility] or [facility] refill protocol or controlled substance     Priority marked High because Todd only has three days left of medication.     Todd informed that Dr. Abraham is currently out of the office but will return on 03/12/2018. He would like to know if dose can be increased to 30 mg per tablet? Todd informed me that he is still having a lot of daytime fatigue. He usually takes one tablet around 5 - 5:30 am, one tablet between 9-10 am and the last tablet around 2 pm. He doesn't like to take it later than 2 pm.     He informed me that he is concerned about his weight loss with this medication. He feels that when he started taking them his weight was around 180 lbs and his weight now is about 150 lbs.          Please review and advise.

## 2018-03-07 RX ORDER — DEXTROAMPHETAMINE SACCHARATE, AMPHETAMINE ASPARTATE, DEXTROAMPHETAMINE SULFATE AND AMPHETAMINE SULFATE 5; 5; 5; 5 MG/1; MG/1; MG/1; MG/1
TABLET ORAL
Qty: 90 TABLET | Refills: 0 | Status: SHIPPED | OUTPATIENT
Start: 2018-03-07 | End: 2018-04-13

## 2018-03-07 NOTE — TELEPHONE ENCOUNTER
Prescription given to [facility] Pharmacist. Attempted to contact Todd. There is no answer and no option to leave a message.

## 2018-03-11 ENCOUNTER — HOSPITAL ENCOUNTER (EMERGENCY)
Facility: CLINIC | Age: 28
Discharge: HOME | End: 2018-03-11
Attending: PHYSICIAN ASSISTANT | Admitting: PHYSICIAN ASSISTANT
Payer: COMMERCIAL

## 2018-03-11 VITALS
OXYGEN SATURATION: 97 % | SYSTOLIC BLOOD PRESSURE: 119 MMHG | DIASTOLIC BLOOD PRESSURE: 66 MMHG | BODY MASS INDEX: 22.24 KG/M2 | WEIGHT: 155 LBS | TEMPERATURE: 98.6 F

## 2018-03-11 DIAGNOSIS — M54.50 ACUTE EXACERBATION OF CHRONIC LOW BACK PAIN: ICD-10-CM

## 2018-03-11 DIAGNOSIS — G89.29 ACUTE EXACERBATION OF CHRONIC LOW BACK PAIN: ICD-10-CM

## 2018-03-11 PROCEDURE — 99282 EMERGENCY DEPT VISIT SF MDM: CPT | Performed by: FAMILY MEDICINE

## 2018-03-11 PROCEDURE — 99284 EMERGENCY DEPT VISIT MOD MDM: CPT | Mod: Z6 | Performed by: FAMILY MEDICINE

## 2018-03-11 ASSESSMENT — ENCOUNTER SYMPTOMS
NECK PAIN: 0
WEAKNESS: 1
NUMBNESS: 0
HEADACHES: 1
NECK STIFFNESS: 0
BACK PAIN: 1
FEVER: 1

## 2018-03-11 NOTE — DISCHARGE INSTRUCTIONS
Take acetaminophen 1000 mg 4 times per day if needed.  Add ibuprofen 400-600 mg 4 times per day if needed.  Follow-up for aggressive back rehabilitation.  Consider the physicians back and neck clinic, [facility].  Alternatively consider evaluation by a physical medicine and rehabilitation specialist.  Do gentle back stretching exercises.  Avoid aggravating activities.  See work restrictions for this week.  Follow-up with your primary care provider if additional restrictions are needed.

## 2018-03-11 NOTE — ED PROVIDER NOTES
"  History     Chief Complaint   Patient presents with     Back Pain     acute on chronic. Worse since Friday.      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  He was initially seen in urgent care and transferred to the ED for further evaluation.    Patient reports he has a long history of low back pain for many years since a fall when he was 19 which caused injury to his left hip and lower back. About three weeks ago, patient noticed onset of \"spine pain\". His pain originates in the lumbar spine and extends upward in to the base of his neck. His pain also radiates into the left leg, arm, and shoulder. He reports more severe left hip pain which he believes is related to his previous injury at age 19. Patient's pain is aggravated by movement and standing. Patient has left arm numbness and notes that his left leg pain radiates from the hip into the popliteal region.     He has taken very little to treat his symptoms.  Yesterday he had a dose of Tylenol.  Otherwise he is taking no medication for it. Patient takes prescribed Adderall for hypersomnia and wonders if his spine pain is a side effect. He works as an air duct and . Patient denies recent bowel or bladder incontinence.    Problem List:    Patient Active Problem List    Diagnosis Date Noted     Idiopathic hypersomnia with long sleep time 09/08/2017     Priority: Medium     Conductive hearing loss in right ear 03/23/2017     Priority: Medium     Mixed hearing loss of left ear 03/23/2017     Priority: Medium     Chronic low back pain 06/28/2016     Priority: Medium     Last Assessment & Plan:   A: Patient with history of acute on chronic low back pain that has been worse since Monday after injury at work. Currently taking Medrol Dosepak and tramadol for pain management " prescribed by the ED. Notes ongoing pain today. No red flags or acutely concerning symptoms at this time. Physical exam shows lower lumbar tenderness to palpation. Discussed that pain is likely related to inflammation of a spine disk and would anticipate improvement with finishing medrol dosepak and taking NSAIDs. Patient expressed an understanding of this.    P:  - Continue Medrol Dosepak and Tramadol as prescribed  - Refilled Tramadol #20 QID PRN, advised patient to not to drive while taking this medication  - Recommended ibuprofen 600 mg QID PRN   - Work note provided to patient   - Advised patient to avoid heavy lifting  - Follow-up in 1 month, sooner if pain persists/worsens and can consider imaging at that time          Past Medical History:    History reviewed. No pertinent past medical history.    Past Surgical History:    History reviewed. No pertinent surgical history.    Family History:    No family history on file.    Social History:  Marital Status:   [2]  Social History   Substance Use Topics     Smoking status: Current Some Day Smoker     Types: Other     Smokeless tobacco: Never Used      Comment: E-CIG      Alcohol use Yes      Comment: occasional use  no requalr usse per wife         Medications:      amphetamine-dextroamphetamine (ADDERALL) 20 MG per tablet   methylphenidate (RITALIN) 20 MG tablet   methylphenidate (RITALIN) 20 MG tablet   methylphenidate (RITALIN) 20 MG tablet         Review of Systems  All other systems are reviewed and are negative    Physical Exam   BP: 119/66  Heart Rate: 88  Temp: 98.6  F (37  C)  Weight: 70.3 kg (155 lb)  SpO2: 97 %    Physical Exam  Nursing note and vitals were reviewed.  Constitutional: Awake and alert, adequately nourished and developed appearing 27-year-old in moderate discomfort, with a blunted restricted affect, who does not appear acutely ill, and who answers questions appropriately and cooperates with examination.  HEENT: EOMI.   Neck: Freely  mobile.  Musculoskeletal: Back examination reveals normal spinal curvatures.  No significant tenderness to palpation over the length of the spine.  No areas of erythema, ecchymosis, swelling, deformity.  Back range of motion is limited on flexion to 30  by pain.  Extension is full but lateral bending is somewhat limited bilaterally.  Rotation is normal.  All range of motion causes some degree of discomfort.  Neurological: Alert, oriented, thought content logical, coherent.  Motor strength is intact in the lower extremities on manual muscle testing.  Skin: Warm, dry, no rashes.      ED Course     ED Course     Procedures               Critical Care time:  none               No results found for this or any previous visit (from the past 24 hour(s)).  Medications - No data to display  16:46 PM Patient assessed.Course of care outlined.    Assessments & Plan (with Medical Decision Making)     27-year-old male presents with acute exacerbation of chronic back pain.  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  He had an injury at age 19 falling off a skateboard injuring his left hip.  He has never had any significant rehab.  I suspect his symptoms are myofascial and mechanical.  I recommended aggressive physical rehabilitation for back strengthening and evaluation from physical medicine and rehabilitation or physicians back and neck clinic.  I recommended short-term relative rest, stretching, and use of acetaminophen and anti-inflammatories.  I recommended stretching.  He should follow-up in the clinic in a week if he needs additional work restrictions.  I restricted him to be off work for the next 2 days and then to be on light duties with no heavy lifting, pulling, pushing for the next week.    I have reviewed the nursing notes.    I have reviewed the findings, diagnosis, plan and need for follow up with the patient.       Discharge Medication List as  of 3/11/2018  5:24 PM          Final diagnoses:   Acute exacerbation of chronic low back pain     This document serves as a record of the services and decisions personally performed and made by Maged Priest MD. It was created on HIS/HER behalf by   Gema Patel, a trained medical scribe. The creation of this document is based the provider's statements to the medical scribe.  Gema Patel 4:46 PM 3/11/2018    Provider:   The information in this document, created by the medical scribe for me, accurately reflects the services I personally performed and the decisions made by me. I have reviewed and approved this document for accuracy prior to leaving the patient care area.  Maged Priest MD 4:46 PM 3/11/2018    3/11/2018   [facility] EMERGENCY DEPARTMENT     Maged Priest MD  03/11/18 1758

## 2018-03-11 NOTE — ED AVS SNAPSHOT
[facility] Emergency Department    [address] [address]    Phone:  [phone]    Fax:  [phone]                                       Mr. Todd Delgado   MRN: [id number]    Department:  [facility] Emergency Department   Date of Visit:  3/11/2018           After Visit Summary Signature Page     I have received my discharge instructions, and my questions have been answered. I have discussed any challenges I see with this plan with the nurse or doctor.    ..........................................................................................................................................  Patient/Patient Representative Signature      ..........................................................................................................................................  Patient Representative Print Name and Relationship to Patient    ..................................................               ................................................  Date                                            Time    ..........................................................................................................................................  Reviewed by Signature/Title    ...................................................              ..............................................  Date                                                            Time

## 2018-03-11 NOTE — ED AVS SNAPSHOT
[facility] Emergency Department    [address] [address]    Phone:  [phone]    Fax:  [phone]                                       Mr. Todd Delgado   MRN: [id number]    Department:  [facility] Emergency Department   Date of Visit:  3/11/2018           Patient Information     Date Of Birth          1990        Your diagnoses for this visit were:     Acute exacerbation of chronic low back pain        You were seen by Christa Stephens PA-C and Maged Priest MD.      Follow-up Information     Follow up with [facility] In 1 week.    Specialties:  Sleep Medicine, Family Medicine, Palliative Medicine    Contact information:    [address] [place] [id number] [phone]        Discharge Instructions       Take acetaminophen 1000 mg 4 times per day if needed.  Add ibuprofen 400-600 mg 4 times per day if needed.  Follow-up for aggressive back rehabilitation.  Consider the physicians back and neck clinic, [email or web address].  Alternatively consider evaluation by a physical medicine and rehabilitation specialist.  Do gentle back stretching exercises.  Avoid aggravating activities.  See work restrictions for this week.  Follow-up with your primary care provider if additional restrictions are needed.    24 Hour Appointment Hotline       To make an appointment at any [facility], call [phone] ([phone]). If you don't have a family doctor or clinic, we will help you find one. [facility] are conveniently located to serve the needs of you and your family.             Review of your medicines      Our records show that you are taking the medicines listed below. If these are incorrect, please call your family doctor or clinic.        Dose / Directions Last dose taken    amphetamine-dextroamphetamine 20 MG per tablet   Commonly known as:  ADDERALL   Quantity:  90 tablet        Take 1 tablet by mouth three times daily  as needed for sleepiness.   Refills:  0        * methylphenidate 20 MG tablet   Commonly known as:  RITALIN   Quantity:  90 tablet        Take 2 tablets by mouth in the morning and 1 tablet in the early afternoon   Refills:  0        * methylphenidate 20 MG tablet   Commonly known as:  RITALIN   Quantity:  90 tablet        Take 2 tablets by mouth in the morning and 1 tablet in the early afternoon   Refills:  0        * methylphenidate 20 MG tablet   Commonly known as:  RITALIN   Quantity:  90 tablet        Take 2 tablets by mouth in the morning and 1 tablet in the early afternoon   Refills:  0        * Notice:  This list has 3 medication(s) that are the same as other medications prescribed for you. Read the directions carefully, and ask your doctor or other care provider to review them with you.            Orders Needing Specimen Collection     None      Pending Results     No orders found from 3/9/2018 to 3/12/2018.            Pending Culture Results     No orders found from 3/9/2018 to 3/12/2018.            Pending Results Instructions     If you had any lab results that were not finalized at the time of your Discharge, you can call the ED Lab Result RN at [phone]. You will be contacted by this team for any positive Lab results or changes in treatment. The nurses are available 7 days a week from 10A to 6:30P.  You can leave a message 24 hours per day and they will return your call.        Test Results From Your Hospital Stay               Thank you for choosing [facility]       Thank you for choosing [facility] for your care. Our goal is always to provide you with excellent care. Hearing back from our patients is one way we can continue to improve our services. Please take a few minutes to complete the written survey that you may receive in the mail after you visit with us. Thank you!        [employer] Information     [employer] lets you send messages to your doctor, view your test results, renew your prescriptions,  "schedule appointments and more. To sign up, go to [email or web address] . Click on \"Log in\" on the left side of the screen, which will take you to the Welcome page. Then click on \"Sign up Now\" on the right side of the page.     You will be asked to enter the access code listed below, as well as some personal information. Please follow the directions to create your username and password.     Your access code is: [email or web address]  Expires: 2018  5:24 PM     Your access code will  in 90 days. If you need help or a new code, please call your [facility] clinic or [phone].        Care EveryWhere ID     This is your Care EveryWhere ID. This could be used by other organizations to access your [facility] medical records  [id number]        Equal Access to Services     ELOISE ANGEL : Naresh Huizar, john edmonds, monica moon, kelley moreno . So [facility] [phone].    ATENCIÓN: Si habla español, tiene a jefferson disposición servicios gratuitos de asistencia lingüística. Llame al [phone].    We comply with applicable federal civil rights laws and [place] laws. We do not discriminate on the basis of race, color, national origin, age, disability, sex, sexual orientation, or gender identity.            After Visit Summary       This is your record. Keep this with you and show to your community pharmacist(s) and doctor(s) at your next visit.                  "

## 2018-03-12 NOTE — ED PROVIDER NOTES
"  History     Chief Complaint   Patient presents with     Back Pain     acute on chronic. Worse since Friday.      HPI  Todd Delgado is a 27 year old male who presents to the urgent care for     Back Pain       Duration: chronic back pain with acute flare up on 3 days ago.         Specific cause: unknown, but patient does a lot of bending at work and heavy lifting. Patient's wife states patient was complaining of back pain when he got home and then his left leg gave out. Patient since then has had worsening back pain with \"spinal pain\" left leg tingling and \"numbness\" and left arm weakness and tingling. Patient denies neck pain. Patient states he has chronic headaches and photophobia with headaches, but states headaches have been worse the past few days. Patient denies fevers, double or blurry vision, confusion, weakness, abdominal pain, chest pain, shortness of breath, recent illness, neck stiffness or pain. Patient denies saddle anesthesia or loss of bowel or bladder function. Patient states he does not like taking anything for the pain, but has taken a dose of tylenol recently with no improvement    Description:   Location of pain: low back bilateral and \"spinal pain\"   Character of pain: sharp and constant  Pain radiation:radiates into the left leg  New numbness or weakness in legs, not attributed to pain:  YES    Intensity: moderate    History:   Pain interferes with job: YES  History of back problems: yes, and has had MRI in the past  Any previous MRI or X-rays: yes: MRI   Sees a specialist for back pain:  No, but it currently trying to get one.   Therapies tried without relief: acetaminophen (Tylenol)    Alleviating factors:   Improved by: none      Precipitating factors:  Worsened by: \"everything\"      Accompanying Signs & Symptoms:  Risk of Fracture:  None  Risk of Cauda Equina:  None  Risk of Infection:  None  Risk of Cancer:  None  Risk of Ankylosing Spondylitis:  Onset at age <35, male, AND morning back " stiffness. no     Patient unable to walk or put weight on left leg in exam room and not wanting to lift leg during exam.  Problem list, Medication list, Allergies, and Medical/Social/Surgical histories reviewed in [facility] and updated as appropriate.      Problem List:    Patient Active Problem List    Diagnosis Date Noted     Idiopathic hypersomnia with long sleep time 09/08/2017     Priority: Medium     Conductive hearing loss in right ear 03/23/2017     Priority: Medium     Mixed hearing loss of left ear 03/23/2017     Priority: Medium     Chronic low back pain 06/28/2016     Priority: Medium     Last Assessment & Plan:   A: Patient with history of acute on chronic low back pain that has been worse since Monday after injury at work. Currently taking Medrol Dosepak and tramadol for pain management prescribed by the ED. Notes ongoing pain today. No red flags or acutely concerning symptoms at this time. Physical exam shows lower lumbar tenderness to palpation. Discussed that pain is likely related to inflammation of a spine disk and would anticipate improvement with finishing medrol dosepak and taking NSAIDs. Patient expressed an understanding of this.    P:  - Continue Medrol Dosepak and Tramadol as prescribed  - Refilled Tramadol #20 QID PRN, advised patient to not to drive while taking this medication  - Recommended ibuprofen 600 mg QID PRN   - Work note provided to patient   - Advised patient to avoid heavy lifting  - Follow-up in 1 month, sooner if pain persists/worsens and can consider imaging at that time          Past Medical History:    History reviewed. No pertinent past medical history.    Past Surgical History:    History reviewed. No pertinent surgical history.    Family History:    No family history on file.    Social History:  Marital Status:   [2]  Social History   Substance Use Topics     Smoking status: Current Some Day Smoker     Types: Other     Smokeless tobacco: Never Used      Comment:  E-CIG      Alcohol use Yes      Comment: occasional use  no requalr usse per wife         Medications:      amphetamine-dextroamphetamine (ADDERALL) 20 MG per tablet   methylphenidate (RITALIN) 20 MG tablet   methylphenidate (RITALIN) 20 MG tablet   methylphenidate (RITALIN) 20 MG tablet         Review of Systems   Constitutional: Positive for fever.   Musculoskeletal: Positive for back pain and gait problem. Negative for neck pain and neck stiffness.   Skin: Negative for rash.   Neurological: Positive for weakness and headaches. Negative for numbness.   All other systems reviewed and are negative.      Physical Exam   BP: 119/66  Heart Rate: 88  Temp: 98.6  F (37  C)  Weight: 70.3 kg (155 lb)  SpO2: 97 %      Physical Exam     General: healthy, alert with no distress, and non toxic in appearance; GCS 15  Cardiovascular: Normal Sinus rhythm, no murmurs, clicks gallops or rub  Respiratory: Lungs clear bilaterally with no rales, rhonchi, or wheezing  Neck: Neck supple. No adenopathy. Full range of motion in neck, and not cervical spine tenderness with palpation.   Abdomen: Abdomen soft, non-tender. BS normal. No masses, organomegaly  NEURO: patient refused to ambulate in urgent care today even with help. Unable to exam for foot drop with walking, but patient able to dorsiflex and extend foot.  Reflexes normal and symmetric. Sensation grossly WNL.   SKIN: no suspicious lesions or rashes  JOINT/EXTREMITIES: extremities normal- no gross deformities noted, normal muscle tone, no edema to the left arm or leg, peripheral pulses normal, decreased range of motion in left arm with lifting it and patient unable to left leg up off the floor when siting, and tender to palpation over the mid thoracic to lower lumbar spine with palpation. No cervical neck tenderness.   BACK:   Range of Motion:  Pain with twisting back, patient unable to bend forward without help due to pain.   Strength:  Patient able to strength 4/5 on left side  throughout in arm and leg compared to right extremities. Patient refusing to walk in urgent care.   Special tests:  Positive pain with radiation to left knee with straight leg raise.     No results found for this or any previous visit (from the past 24 hour(s)).        ED Course     ED Course     Procedures              Critical Care time:  None    Discussed case with Dr. Priest in Emergency Room and patient was transferred to Emergency Room for further evaluation and treatment of back pain and tingling/weakness in left arm and leg.                No results found for this or any previous visit (from the past 24 hour(s)).    Assessments & Plan (with Medical Decision Making)     I have reviewed the nursing notes.    I have reviewed the findings, diagnosis, plan and need for follow up with the patient.       Discharge Medication List as of 3/11/2018  5:24 PM          Final diagnoses:   Acute exacerbation of chronic low back pain       3/11/2018   [facility] EMERGENCY DEPARTMENT     Christa Stephens PA-C  03/11/18 1202

## 2018-03-15 ENCOUNTER — TELEPHONE (OUTPATIENT)
Dept: FAMILY MEDICINE | Facility: CLINIC | Age: 28
End: 2018-03-15

## 2018-03-15 DIAGNOSIS — M54.16 LUMBAR RADICULOPATHY: Primary | ICD-10-CM

## 2018-03-15 NOTE — TELEPHONE ENCOUNTER
I think a medical back pain specialist would be more appropriate than a neurologist.  Referral placed through ortho  and they should be contacting the patient within 24 hours

## 2018-03-15 NOTE — TELEPHONE ENCOUNTER
Reason for Call:  Other     Detailed comments: Patient calling requesting a referral to a neurologist due to increased back issues. Patient hasn't seen Dr. Nice regarding back issues since 6-16-17. Informed patient since issues are getting worse that he should make an appointment with Dr. Nice. Patient declined making an appointment and wants to see if Dr. Nice will enter the referral without being seen.    Phone Number Patient can be reached at: Home number on file [phone] (home)    Best Time: any    Can we leave a detailed message on this number? YES    Call taken on 3/15/2018 at 1:11 PM by Roma Roldan

## 2018-04-13 DIAGNOSIS — G47.11 IDIOPATHIC HYPERSOMNIA WITH LONG SLEEP TIME: Primary | ICD-10-CM

## 2018-04-13 NOTE — TELEPHONE ENCOUNTER
Chief Complaint   Patient presents with     Refill Request     amphetamine-dextroamphetamine (ADDERALL) 20 MG per tablet      Refill request received via phone by patient [facility] [address] tel:[phone]    Pending Prescriptions:                       Disp   Refills    amphetamine-dextroamphetamine (ADDERALL) *90 tab*0            Sig: Take 1 tablet by mouth three times daily as           needed for sleepiness.    amphetamine-dextroamphetamine (ADDERALL) *90 tab*0            Sig: Take 1 tablet by mouth three times daily as           needed for sleepiness.    amphetamine-dextroamphetamine (ADDERALL) *60 tab*0            Sig: Take 1 tablet by mouth three times daily as           needed for sleepiness.         Last Written Prescription Date:  03/07/2018  Last Fill Quantity: 90 per 30 days,   # refills: 0  Last Office Visit with prescribing provider: 09/07/2017  Future Office visit:       Routing refill request to provider for review/approval because:  Drug not on the [facility] or [facility] refill protocol or controlled substance

## 2018-04-16 RX ORDER — DEXTROAMPHETAMINE SACCHARATE, AMPHETAMINE ASPARTATE, DEXTROAMPHETAMINE SULFATE AND AMPHETAMINE SULFATE 5; 5; 5; 5 MG/1; MG/1; MG/1; MG/1
TABLET ORAL
Qty: 60 TABLET | Refills: 0 | Status: SHIPPED | OUTPATIENT
Start: 2018-04-16 | End: 2018-05-02

## 2018-04-16 RX ORDER — DEXTROAMPHETAMINE SACCHARATE, AMPHETAMINE ASPARTATE, DEXTROAMPHETAMINE SULFATE AND AMPHETAMINE SULFATE 5; 5; 5; 5 MG/1; MG/1; MG/1; MG/1
TABLET ORAL
Qty: 90 TABLET | Refills: 0 | Status: SHIPPED | OUTPATIENT
Start: 2018-05-15 | End: 2018-05-24

## 2018-04-16 RX ORDER — DEXTROAMPHETAMINE SACCHARATE, AMPHETAMINE ASPARTATE, DEXTROAMPHETAMINE SULFATE AND AMPHETAMINE SULFATE 5; 5; 5; 5 MG/1; MG/1; MG/1; MG/1
TABLET ORAL
Qty: 90 TABLET | Refills: 0 | Status: SHIPPED | OUTPATIENT
Start: 2018-06-14 | End: 2018-05-24

## 2018-04-16 NOTE — TELEPHONE ENCOUNTER
Prescriptions dated April, May, June given to [facility] Pharmacist  Todd informed of message via telephone call.

## 2018-05-02 DIAGNOSIS — G47.11 IDIOPATHIC HYPERSOMNIA WITH LONG SLEEP TIME: ICD-10-CM

## 2018-05-02 NOTE — TELEPHONE ENCOUNTER
Todd called and scheduled an appointment coming up 05/15/2017 because he doesn't feel the medication is working very well.     Also, Todd informed me that he filled a prescription dated 04/16/2018 and he received 60 tablets, however he takes 3 tablets per day and normally gets qty 90.     Please review.

## 2018-05-03 RX ORDER — DEXTROAMPHETAMINE SACCHARATE, AMPHETAMINE ASPARTATE, DEXTROAMPHETAMINE SULFATE AND AMPHETAMINE SULFATE 5; 5; 5; 5 MG/1; MG/1; MG/1; MG/1
TABLET ORAL
Qty: 90 TABLET | Refills: 0 | Status: SHIPPED | OUTPATIENT
Start: 2018-05-03 | End: 2018-05-24

## 2018-05-16 ENCOUNTER — TELEPHONE (OUTPATIENT)
Dept: SLEEP MEDICINE | Facility: CLINIC | Age: 28
End: 2018-05-16

## 2018-05-16 DIAGNOSIS — G47.11 IDIOPATHIC HYPERSOMNIA WITH LONG SLEEP TIME: ICD-10-CM

## 2018-05-16 NOTE — TELEPHONE ENCOUNTER
Received an incoming call from Todd requesting a dose adjustment to his amphetamine-dextroamphetamine (ADDERALL) 20 MG per tablet. Todd informed me that by 3 pm he is very tired and feels that his body has gotten accustomed to his current dose. He is wondering if this medication comes in 30 mg tablets? If not can a prescription be written for 4 daily?

## 2018-05-17 NOTE — TELEPHONE ENCOUNTER
It does come in a 30mg tablet, I am comfortable with a final increase.  If we are still having concerns, we may need to discuss a more thorough medical work-up.

## 2018-05-21 NOTE — TELEPHONE ENCOUNTER
Todd does want to increase the medication now.   He filled a prescription Adderall 20 mg tablets qty 90 on 05/18/2018. There is one prescription on hold at the pharmacy as well dated 06/14/2018     If his insurance pays for the 30 mg tablets, how would you prefer he transition to the new dose?   Please advise.

## 2018-05-23 NOTE — TELEPHONE ENCOUNTER
Todd informed of message via telephone call.      Please review pending medication with new dose. Please verify/ change/ confirm quantities and post-dated dates.  Todd said he will run out faster with the new dose.There is one prescription on hold at the pharmacy but pharmacy may not let him fill early. If new prescriptions are signed will need to shred old script. Please advise.

## 2018-05-24 RX ORDER — DEXTROAMPHETAMINE SACCHARATE, AMPHETAMINE ASPARTATE, DEXTROAMPHETAMINE SULFATE AND AMPHETAMINE SULFATE 7.5; 7.5; 7.5; 7.5 MG/1; MG/1; MG/1; MG/1
TABLET ORAL
Qty: 90 TABLET | Refills: 0 | Status: SHIPPED | OUTPATIENT
Start: 2018-06-29 | End: 2018-06-15

## 2018-05-24 RX ORDER — DEXTROAMPHETAMINE SACCHARATE, AMPHETAMINE ASPARTATE, DEXTROAMPHETAMINE SULFATE AND AMPHETAMINE SULFATE 7.5; 7.5; 7.5; 7.5 MG/1; MG/1; MG/1; MG/1
TABLET ORAL
Qty: 90 TABLET | Refills: 0 | Status: SHIPPED | OUTPATIENT
Start: 2018-06-01 | End: 2018-06-15

## 2018-05-24 RX ORDER — DEXTROAMPHETAMINE SACCHARATE, AMPHETAMINE ASPARTATE, DEXTROAMPHETAMINE SULFATE AND AMPHETAMINE SULFATE 7.5; 7.5; 7.5; 7.5 MG/1; MG/1; MG/1; MG/1
TABLET ORAL
Qty: 90 TABLET | Refills: 0 | Status: SHIPPED | OUTPATIENT
Start: 2018-07-27 | End: 2018-06-15

## 2018-05-24 NOTE — TELEPHONE ENCOUNTER
Prescriptions given to [facility] [place], [place] - [address] pharmacist.     Todd informed of message via telephone call.

## 2018-06-01 ENCOUNTER — TELEPHONE (OUTPATIENT)
Dept: SLEEP MEDICINE | Facility: CLINIC | Age: 28
End: 2018-06-01

## 2018-06-01 NOTE — TELEPHONE ENCOUNTER
Prior Authorization Retail Medication Request    Medication/Dose: Adderall 30mg  ICD code (if different than what is on RX):  Idiopathic hypersomnia with long sleep time [G47.11]   Previously Tried and Failed:    amphetamine-dextroamphetamine (ADDERALL) 20 MG per tablet  methylphenidate (RITALIN) 20 MG tablet  modafinil (PROVIGIL) 200 MG tablet     Rationale:      Insurance Name:  [employer] [facility]  Insurance ID:  [id number]      Pharmacy Information (if different than what is on RX)  Name:  [facility] [place], [place] - [address]   Phone:  [phone]

## 2018-06-01 NOTE — TELEPHONE ENCOUNTER
Patients insurance will not cover (3) 30mg tablets daily, prior authorization is required.     Ins: [employer]  Id: [id number]  Phone Number: [phone]    Thank You-  Lida Dukes, [facility] [place]

## 2018-06-04 NOTE — TELEPHONE ENCOUNTER
Central Prior Authorization Team   Phone: [phone]    PA Initiation    Medication: Adderall 30mg  Insurance Company: [employer] [place] - Phone [phone] Fax [phone]  Pharmacy Filling the Rx: [place], [place] - [address]  Filling Pharmacy Phone: [phone]  Filling Pharmacy Fax: [phone]  Start Date: 6/4/2018

## 2018-06-05 NOTE — TELEPHONE ENCOUNTER
PRIOR AUTHORIZATION DENIED    Medication: Adderall 30mg-DENIED    Denial Date: 6/4/2018    Denial Rational:  INSURANCE WILL APPROVED 2 TABLETS PER DAY.            Appeal Information:  IF YOU WOULD LIKE TO APPEAL PLEASE SUPPLY PA TEAM WITH A LETTER OF MEDICAL NECESSITY WITH CLINICAL REASON.

## 2018-06-05 NOTE — TELEPHONE ENCOUNTER
Dr. Abraham,   Please review and advise. Prior Authorization denied. Please let me know if you would like to try for alternate dose, alternate medication, or if you would like to appeal please create letter of medical necessity in chart and route back to sleep team.     Todd informed that Prior Authorization request was denied and a message is to be forwarded to provider for review.

## 2018-06-05 NOTE — TELEPHONE ENCOUNTER
Given the issue of quantity limit, I don't see a reason for attempt at appeal.  We can discuss potential different options at our follow-up.

## 2018-06-13 ENCOUNTER — TELEPHONE (OUTPATIENT)
Dept: SLEEP MEDICINE | Facility: CLINIC | Age: 28
End: 2018-06-13

## 2018-06-13 NOTE — TELEPHONE ENCOUNTER
Prior Authorization Retail Medication Request    Medication/Dose: adderall 30 mg tabs pa required exceeds limits 2 per day  ICD code (if different than what is on RX):  na  Previously Tried and Failed: na  Rationale: na    Insurance Name: [facility] [phone]  Insurance ID:[id number]      Pharmacy Information (if different than what is on RX)  Name:  Same   Phone:  same

## 2018-06-15 ENCOUNTER — OFFICE VISIT (OUTPATIENT)
Dept: SLEEP MEDICINE | Facility: CLINIC | Age: 28
End: 2018-06-15
Payer: COMMERCIAL

## 2018-06-15 ENCOUNTER — TELEPHONE (OUTPATIENT)
Dept: SLEEP MEDICINE | Facility: CLINIC | Age: 28
End: 2018-06-15

## 2018-06-15 VITALS
SYSTOLIC BLOOD PRESSURE: 121 MMHG | HEART RATE: 85 BPM | DIASTOLIC BLOOD PRESSURE: 69 MMHG | WEIGHT: 153.4 LBS | OXYGEN SATURATION: 96 % | BODY MASS INDEX: 22.01 KG/M2

## 2018-06-15 DIAGNOSIS — G47.11 IDIOPATHIC HYPERSOMNIA WITH LONG SLEEP TIME: ICD-10-CM

## 2018-06-15 PROCEDURE — 99214 OFFICE O/P EST MOD 30 MIN: CPT | Performed by: FAMILY MEDICINE

## 2018-06-15 RX ORDER — DEXTROAMPHETAMINE SACCHARATE, AMPHETAMINE ASPARTATE, DEXTROAMPHETAMINE SULFATE AND AMPHETAMINE SULFATE 7.5; 7.5; 7.5; 7.5 MG/1; MG/1; MG/1; MG/1
TABLET ORAL
Qty: 90 TABLET | Refills: 0 | Status: SHIPPED | OUTPATIENT
Start: 2018-06-15 | End: 2018-06-15

## 2018-06-15 RX ORDER — MODAFINIL 200 MG/1
200 TABLET ORAL DAILY
Qty: 30 TABLET | Refills: 5 | Status: SHIPPED | OUTPATIENT
Start: 2018-06-15 | End: 2019-11-25

## 2018-06-15 RX ORDER — DEXTROAMPHETAMINE SACCHARATE, AMPHETAMINE ASPARTATE, DEXTROAMPHETAMINE SULFATE AND AMPHETAMINE SULFATE 5; 5; 5; 5 MG/1; MG/1; MG/1; MG/1
TABLET ORAL
Qty: 90 TABLET | Refills: 0 | Status: SHIPPED | OUTPATIENT
Start: 2018-07-15 | End: 2018-10-03

## 2018-06-15 RX ORDER — DEXTROAMPHETAMINE SACCHARATE, AMPHETAMINE ASPARTATE, DEXTROAMPHETAMINE SULFATE AND AMPHETAMINE SULFATE 7.5; 7.5; 7.5; 7.5 MG/1; MG/1; MG/1; MG/1
TABLET ORAL
Qty: 90 TABLET | Refills: 0 | Status: SHIPPED | OUTPATIENT
Start: 2018-07-15 | End: 2018-06-15

## 2018-06-15 RX ORDER — DEXTROAMPHETAMINE SACCHARATE, AMPHETAMINE ASPARTATE, DEXTROAMPHETAMINE SULFATE AND AMPHETAMINE SULFATE 7.5; 7.5; 7.5; 7.5 MG/1; MG/1; MG/1; MG/1
TABLET ORAL
Qty: 90 TABLET | Refills: 0 | Status: SHIPPED | OUTPATIENT
Start: 2018-08-15 | End: 2018-06-15

## 2018-06-15 RX ORDER — DEXTROAMPHETAMINE SACCHARATE, AMPHETAMINE ASPARTATE, DEXTROAMPHETAMINE SULFATE AND AMPHETAMINE SULFATE 5; 5; 5; 5 MG/1; MG/1; MG/1; MG/1
TABLET ORAL
Qty: 90 TABLET | Refills: 0 | Status: SHIPPED | OUTPATIENT
Start: 2018-06-15 | End: 2018-10-03

## 2018-06-15 RX ORDER — DEXTROAMPHETAMINE SACCHARATE, AMPHETAMINE ASPARTATE, DEXTROAMPHETAMINE SULFATE AND AMPHETAMINE SULFATE 5; 5; 5; 5 MG/1; MG/1; MG/1; MG/1
TABLET ORAL
Qty: 90 TABLET | Refills: 0 | Status: SHIPPED | OUTPATIENT
Start: 2018-08-15 | End: 2018-10-03

## 2018-06-15 NOTE — LETTER
6/15/2018     Todd Delgado  [address] [place] [address]      To whom it may concern,    I am writing on behalf of Todd Delgado who is being actively followed here at the [facility] for a sleep disorder.  He is on treatment and has been highly compliant.  Given this, I do not feel he is a liability in consideration for promotion, including duties that involve driving.    Please call the [facility] with any questions at [phone].      Sincerely,        Atul Abraham MD  Medical Director  [facility]  [address] [facility] [address]  Phone: [phone]

## 2018-06-15 NOTE — TELEPHONE ENCOUNTER
Prior Authorization Retail Medication Request    Medication/Dose: provigil 200 mg  ICD code (if different than what is on RX):  na  Previously Tried and Failed: na   Rationale: na     Insurance Name: [employer]   Insurance ID:[id number] message on rejection fax pa to [phone]      Pharmacy Information (if different than what is on RX)  Name: same   Phone: same

## 2018-06-15 NOTE — MR AVS SNAPSHOT
"              After Visit Summary   6/15/2018    Mr. Todd Delgado    MRN: [id number]           Patient Information     Date Of Birth          1990        Visit Information        Provider Department      6/15/2018 3:30 PM Atul Abraham MD [facility]        Today's Diagnoses     Idiopathic hypersomnia with long sleep time           Follow-ups after your visit        Follow-up notes from your care team     Return in about 4 weeks (around 2018), or if symptoms worsen or fail to improve.      Who to contact     If you have questions or need follow up information about today's clinic visit or your schedule please contact [facility] directly at [phone].  Normal or non-critical lab and imaging results will be communicated to you by MyChart, letter or phone within 4 business days after the clinic has received the results. If you do not hear from us within 7 days, please contact the clinic through MyChart or phone. If you have a critical or abnormal lab result, we will notify you by phone as soon as possible.  Submit refill requests through [employer] or call your pharmacy and they will forward the refill request to us. Please allow 3 business days for your refill to be completed.          Additional Information About Your Visit        MyChart Information     [employer] lets you send messages to your doctor, view your test results, renew your prescriptions, schedule appointments and more. To sign up, go to [email or web address] . Click on \"Log in\" on the left side of the screen, which will take you to the Welcome page. Then click on \"Sign up Now\" on the right side of the page.     You will be asked to enter the access code listed below, as well as some personal information. Please follow the directions to create your username and password.     Your access code is: [email or web address]  Expires: 9/15/2018  6:09 PM     Your access code will  in 90 days. If you need help or " a new code, please call your [place] clinic or [phone].        Care EveryWhere ID     This is your Care EveryWhere ID. This could be used by other organizations to access your [facility] medical records  [id number]        Your Vitals Were     Pulse Pulse Oximetry BMI (Body Mass Index)             85 96% 22.01 kg/m2          Blood Pressure from Last 3 Encounters:   06/15/18 121/69   03/11/18 119/66   09/07/17 108/71    Weight from Last 3 Encounters:   06/15/18 69.6 kg (153 lb 6.4 oz)   03/11/18 70.3 kg (155 lb)   09/07/17 77.6 kg (171 lb)              Today, you had the following     No orders found for display         Today's Medication Changes          These changes are accurate as of 6/15/18 11:59 PM.  If you have any questions, ask your nurse or doctor.               Start taking these medicines.        Dose/Directions    * amphetamine-dextroamphetamine 20 MG per tablet   Commonly known as:  ADDERALL   Used for:  Idiopathic hypersomnia with long sleep time   Started by:  Atul Abraham MD        Take 1 tablet by mouth three times daily as needed for sleepiness.   Quantity:  90 tablet   Refills:  0       * amphetamine-dextroamphetamine 20 MG per tablet   Commonly known as:  ADDERALL   Used for:  Idiopathic hypersomnia with long sleep time   Started by:  Atul Abraham MD        Start taking on:  7/15/2018   Take 1 tablet by mouth three times daily as needed for sleepiness.   Quantity:  90 tablet   Refills:  0       * amphetamine-dextroamphetamine 20 MG per tablet   Commonly known as:  ADDERALL   Used for:  Idiopathic hypersomnia with long sleep time   Started by:  Atul Abraham MD        Start taking on:  8/15/2018   Take 1 tablet by mouth three times daily as needed for sleepiness.   Quantity:  90 tablet   Refills:  0       modafinil 200 MG tablet   Commonly known as:  PROVIGIL   Used for:  Idiopathic hypersomnia with long sleep time   Started by:  Atul Abraham MD         Dose:  200 mg   Take 1 tablet (200 mg) by mouth daily   Quantity:  30 tablet   Refills:  5       * Notice:  This list has 3 medication(s) that are the same as other medications prescribed for you. Read the directions carefully, and ask your doctor or other care provider to review them with you.         Where to get your medicines      Some of these will need a paper prescription and others can be bought over the counter.  Ask your nurse if you have questions.     Bring a paper prescription for each of these medications     amphetamine-dextroamphetamine 20 MG per tablet    amphetamine-dextroamphetamine 20 MG per tablet    amphetamine-dextroamphetamine 20 MG per tablet    modafinil 200 MG tablet                Primary Care Provider Office Phone # Fax #    [facility] [phone] [address] [address]        Equal Access to Services     ELOISE ANGEL : Naresh carmona Somaggi, waaxda luqadaha, qaybta kaalmada adeegyaulises, kelley moreno . So [facility] [phone].    ATENCIÓN: Si habla español, tiene a jefferson disposición servicios gratuitos de asistencia lingüística. Llame al [phone].    We comply with applicable federal civil rights laws and [place] laws. We do not discriminate on the basis of race, color, national origin, age, disability, sex, sexual orientation, or gender identity.            Thank you!     Thank you for choosing [facility]  for your care. Our goal is always to provide you with excellent care. Hearing back from our patients is one way we can continue to improve our services. Please take a few minutes to complete the written survey that you may receive in the mail after your visit with us. Thank you!             Your Updated Medication List - Protect others around you: Learn how to safely use, store and throw away your medicines at www.disposemymeds.org.          This list is accurate as of  6/15/18 11:59 PM.  Always use your most recent med list.                   Brand Name Dispense Instructions for use Diagnosis    * amphetamine-dextroamphetamine 20 MG per tablet    ADDERALL    90 tablet    Take 1 tablet by mouth three times daily as needed for sleepiness.    Idiopathic hypersomnia with long sleep time       * amphetamine-dextroamphetamine 20 MG per tablet   Start taking on:  7/15/2018    ADDERALL    90 tablet    Take 1 tablet by mouth three times daily as needed for sleepiness.    Idiopathic hypersomnia with long sleep time       * amphetamine-dextroamphetamine 20 MG per tablet   Start taking on:  8/15/2018    ADDERALL    90 tablet    Take 1 tablet by mouth three times daily as needed for sleepiness.    Idiopathic hypersomnia with long sleep time       modafinil 200 MG tablet    PROVIGIL    30 tablet    Take 1 tablet (200 mg) by mouth daily    Idiopathic hypersomnia with long sleep time       * Notice:  This list has 3 medication(s) that are the same as other medications prescribed for you. Read the directions carefully, and ask your doctor or other care provider to review them with you.

## 2018-06-17 NOTE — PROGRESS NOTES
"    Sleep Study Follow-Up Visit:    Date on this visit: 6/15/2018    Todd Delgado comes in today for follow-up of chronic daytime fatigue vs idiopathic insomnia with long sleep time.    9/7/2017 - Visit to review actigraphy / PSG / MSLT.  Actigraphy suggestive of long sleep need, TST ranging from 8-9 hours during week and often 12+ hours on weekends with overall average total sleep time of 570 minutes (~9.5 hours).  PSG with no significant sleep disordered breathing.  MSLT with mean sleep latency of 14.3 minutes on 4 naps (10.9, 12.8, 13.5, 20).  Given significant social impairment, consistent history, we did agree to trial of wakefulness promoting medication with modafinil 100-200mg PO QD to BID.    Today - Returns for follow-up.  Since last visit, multiple telephone encounters. Modafinil not approved, so trial of methylphenidate.  This was felt to be minimal beneficial and noted weight loss (? ~20 lbs).  We did transition to adderall 20mg PO BID to TID with some benefit, weight stable and slowly returning to baseline.  He describes as his response to medication as being still \"tired\" but feeling his brain is \"more snappy and alert\".  Has had continued excellent performance at work.  Works in , report of potential promotion but mentioned that his sleep disorder may be a \"liability\".  Denies any inappropriate sleepiness with driving.  Noted to have multiple negative urine tox screens, he denies any THC or other chemical use.    Prior Sleep Testing:  Actigraphy performed 8/7/2017 - 8/23/2017, sleep diaries were completed.  Average sleep time during weekdays of ~8-9 hours, but seen to be often 12+ hours on weekends.  No significant daytime napping noted.  Overall, low daytime activity seen on weekends.  Overall, average total sleep time of 570 minutes.     PSG performed 8/23/2017 with AHI 1.3, martir SpO2 92.7%, baseline SpO2 96.5%.  PLM index of 1/hour.  Total sleep time of 462.5 minutes.     MSLT " performed 8/24/2017, 4 naps (10.9, 12.8, 13.5, 20) with mean sleep latency of 14.3 minutes and no sleep onset REM's.  Urine toxicology was negative.    Past medical/surgical history, family history, social history, medications and allergies were reviewed.      Problem List:  Patient Active Problem List    Diagnosis Date Noted     Idiopathic hypersomnia with long sleep time 09/08/2017     Priority: Medium     Conductive hearing loss in right ear 03/23/2017     Priority: Medium     Mixed hearing loss of left ear 03/23/2017     Priority: Medium     Chronic low back pain 06/28/2016     Priority: Medium     Last Assessment & Plan:   A: Patient with history of acute on chronic low back pain that has been worse since Monday after injury at work. Currently taking Medrol Dosepak and tramadol for pain management prescribed by the ED. Notes ongoing pain today. No red flags or acutely concerning symptoms at this time. Physical exam shows lower lumbar tenderness to palpation. Discussed that pain is likely related to inflammation of a spine disk and would anticipate improvement with finishing medrol dosepak and taking NSAIDs. Patient expressed an understanding of this.    P:  - Continue Medrol Dosepak and Tramadol as prescribed  - Refilled Tramadol #20 QID PRN, advised patient to not to drive while taking this medication  - Recommended ibuprofen 600 mg QID PRN   - Work note provided to patient   - Advised patient to avoid heavy lifting  - Follow-up in 1 month, sooner if pain persists/worsens and can consider imaging at that time          Impression/Plan:    1.)  Chronic fatigue vs idiopathic hypersomnia with long sleep time    Overall, he does appear to report benefit / response to treatment.  Does appear to be working on getting adequate total sleep time, but actigraphy is suggestive of a sleep need of 9-10+ hours.  Plan to continue Adderall 20mg PO TID and will attempt now to get PA approval for modafinil.    He will follow up  with me in about 1 month(s).     Twenty-five minutes spent with patient, all of which were spent face-to-face counseling, consulting, coordinating plan of care.      Atul Abraham MD    CC: Clinic, [facility]

## 2018-06-25 NOTE — TELEPHONE ENCOUNTER
PA Initiation    Medication: modafinil (PROVIGIL) 200 MG tablet  Insurance Company: [employer] [place] - Phone [phone] Fax [phone]  Pharmacy Filling the Rx: [place], [place] - [address]  Filling Pharmacy Phone: [phone]  Filling Pharmacy Fax:    Start Date: 6/25/2018

## 2018-06-26 NOTE — TELEPHONE ENCOUNTER
PRIOR AUTHORIZATION DENIED    Medication: modafinil (PROVIGIL) 200 MG tablet - DENIED     Denial Date: 6/26/2018    Denial Rational: The PA has been denied because:           Appeal Information: Please provide a letter of medical necessity

## 2018-07-06 NOTE — TELEPHONE ENCOUNTER
That is unfortunate.  Options are somewhat limited, currently my best option would be to consider a careful addition of methylphenidate (ritalin).

## 2018-07-17 NOTE — TELEPHONE ENCOUNTER
Todd informed of message via telephone call.    Todd informed me that he now has new insurance.   It is [place] care. Member ID: [id number]  RX number: [id number]    No group or pcn information available.     Insurance provider help desk telephone: [phone]  Member help desk telephone: [phone]    Informed patient to contact pharmacy with new insurance information and the request could be processed with the most up to date insurance.

## 2018-10-03 DIAGNOSIS — G47.11 IDIOPATHIC HYPERSOMNIA WITH LONG SLEEP TIME: ICD-10-CM

## 2018-10-03 NOTE — TELEPHONE ENCOUNTER
Pt called requesting a refill on Adderall      Last Written Prescription Date:  8/15/18  Last Fill Quantity: 90,   # refills: 0  Last Office Visit: 6/15/18  Future Office visit:       Routing refill request to provider for review/approval because:  Drug not on the [facility], [facility] or [facility] refill protocol or controlled substance      Call when ready

## 2018-10-04 RX ORDER — DEXTROAMPHETAMINE SACCHARATE, AMPHETAMINE ASPARTATE, DEXTROAMPHETAMINE SULFATE AND AMPHETAMINE SULFATE 5; 5; 5; 5 MG/1; MG/1; MG/1; MG/1
TABLET ORAL
Qty: 90 TABLET | Refills: 0 | Status: SHIPPED | OUTPATIENT
Start: 2018-11-03 | End: 2019-01-08

## 2018-10-04 RX ORDER — DEXTROAMPHETAMINE SACCHARATE, AMPHETAMINE ASPARTATE, DEXTROAMPHETAMINE SULFATE AND AMPHETAMINE SULFATE 5; 5; 5; 5 MG/1; MG/1; MG/1; MG/1
TABLET ORAL
Qty: 90 TABLET | Refills: 0 | Status: SHIPPED | OUTPATIENT
Start: 2018-12-03 | End: 2019-01-08

## 2018-10-04 RX ORDER — DEXTROAMPHETAMINE SACCHARATE, AMPHETAMINE ASPARTATE, DEXTROAMPHETAMINE SULFATE AND AMPHETAMINE SULFATE 5; 5; 5; 5 MG/1; MG/1; MG/1; MG/1
TABLET ORAL
Qty: 90 TABLET | Refills: 0 | Status: SHIPPED | OUTPATIENT
Start: 2018-10-04 | End: 2019-01-08

## 2018-10-04 NOTE — TELEPHONE ENCOUNTER
Todd informed of message via telephone call.    He would like the prescription dated 10/04/18 to be brought to [facility], [place] - [address]     And the prescriptions dated 11/03/18 and 12/03/18 to be placed in an envelope at our clinic .     Todd was reminded to bring ID.

## 2018-11-07 ENCOUNTER — TELEPHONE (OUTPATIENT)
Dept: SLEEP MEDICINE | Facility: CLINIC | Age: 28
End: 2018-11-07

## 2018-11-07 NOTE — TELEPHONE ENCOUNTER
Patient called today.    Patient would like to do a medication review for Adderall.  Not feeling well; anxiety.    Provider Dr. Abraham is booked and requesting  to see Dr. Abraham sooner than December 21, 2018 (first available date for this provider).    Provider is open for lots of CONSULT slots and no RETURN slots.    Please contact patient.    Thank you.    Central Scheduling  Yesy SIMS

## 2018-11-09 NOTE — TELEPHONE ENCOUNTER
I had left a detailed message on his identified voice mail about the appt opening for 11/8/18 which was scheduled yesterday. Patient did not call back to cancel . He no showed.

## 2019-01-08 DIAGNOSIS — G47.11 IDIOPATHIC HYPERSOMNIA WITH LONG SLEEP TIME: ICD-10-CM

## 2019-01-08 RX ORDER — DEXTROAMPHETAMINE SACCHARATE, AMPHETAMINE ASPARTATE, DEXTROAMPHETAMINE SULFATE AND AMPHETAMINE SULFATE 5; 5; 5; 5 MG/1; MG/1; MG/1; MG/1
TABLET ORAL
Qty: 90 TABLET | Refills: 0 | Status: SHIPPED | OUTPATIENT
Start: 2019-02-08 | End: 2019-03-19

## 2019-01-08 RX ORDER — DEXTROAMPHETAMINE SACCHARATE, AMPHETAMINE ASPARTATE, DEXTROAMPHETAMINE SULFATE AND AMPHETAMINE SULFATE 5; 5; 5; 5 MG/1; MG/1; MG/1; MG/1
TABLET ORAL
Qty: 90 TABLET | Refills: 0 | Status: SHIPPED | OUTPATIENT
Start: 2019-01-08 | End: 2019-03-19

## 2019-01-08 RX ORDER — DEXTROAMPHETAMINE SACCHARATE, AMPHETAMINE ASPARTATE, DEXTROAMPHETAMINE SULFATE AND AMPHETAMINE SULFATE 5; 5; 5; 5 MG/1; MG/1; MG/1; MG/1
TABLET ORAL
Qty: 90 TABLET | Refills: 0 | Status: SHIPPED | OUTPATIENT
Start: 2019-03-08 | End: 2019-03-19

## 2019-01-08 NOTE — TELEPHONE ENCOUNTER
Prescription for amphetamine-dextramphetamine (ADDERALL) 20 MG tablet dated 01/08/2019, 02/08/2019, 03/05/2019 given to pharmacist at [facility]    Attempted to contact Todd. Left message on answering machine.

## 2019-01-08 NOTE — TELEPHONE ENCOUNTER
Chief Complaint   Patient presents with     Refill Request     amphetamine-dextroamphetamine (ADDERALL) 20 MG per tablet      [place] request received via phone by patient or caregiver   [facility] [place] [address] tel:[phone]    Todd requests prescriptions be brought to pharmacy.    Pending Prescriptions:                       Disp   Refills    amphetamine-dextroamphetamine (ADDERALL) *90 tab*0            Sig: Take 1 tablet by mouth three times daily as           needed for sleepiness.    amphetamine-dextroamphetamine (ADDERALL) *90 tab*0            Sig: Take 1 tablet by mouth three times daily as           needed for sleepiness.    amphetamine-dextroamphetamine (ADDERALL) *90 tab*0            Sig: Take 1 tablet by mouth three times daily as           needed for sleepiness.         Last Written Prescription Date:  12/03/2018  Last Fill Quantity: 90 per 30 days,   # refills: 0  Last Office Visit with prescribing provider: 06/15/2018  Future Office visit:       Routing refill request to provider for review/approval because:  Drug not on the [facility], P or [facility] refill protocol or controlled substance

## 2019-03-19 DIAGNOSIS — G47.11 IDIOPATHIC HYPERSOMNIA WITH LONG SLEEP TIME: ICD-10-CM

## 2019-03-19 RX ORDER — DEXTROAMPHETAMINE SACCHARATE, AMPHETAMINE ASPARTATE, DEXTROAMPHETAMINE SULFATE AND AMPHETAMINE SULFATE 5; 5; 5; 5 MG/1; MG/1; MG/1; MG/1
TABLET ORAL
Qty: 90 TABLET | Refills: 0 | Status: SHIPPED | OUTPATIENT
Start: 2019-05-03 | End: 2019-05-30

## 2019-03-19 RX ORDER — DEXTROAMPHETAMINE SACCHARATE, AMPHETAMINE ASPARTATE, DEXTROAMPHETAMINE SULFATE AND AMPHETAMINE SULFATE 5; 5; 5; 5 MG/1; MG/1; MG/1; MG/1
TABLET ORAL
Qty: 90 TABLET | Refills: 0 | Status: SHIPPED | OUTPATIENT
Start: 2019-04-03 | End: 2019-09-19

## 2019-03-19 RX ORDER — DEXTROAMPHETAMINE SACCHARATE, AMPHETAMINE ASPARTATE, DEXTROAMPHETAMINE SULFATE AND AMPHETAMINE SULFATE 5; 5; 5; 5 MG/1; MG/1; MG/1; MG/1
TABLET ORAL
Qty: 90 TABLET | Refills: 0 | Status: SHIPPED | OUTPATIENT
Start: 2019-06-03 | End: 2019-05-30

## 2019-03-19 NOTE — TELEPHONE ENCOUNTER
Chief Complaint   Patient presents with     Refill Request     amphetamine-dextroamphetamine (ADDERALL) 20 MG tablet        Pending Prescriptions:                       Disp   Refills    amphetamine-dextroamphetamine (ADDERALL) *90 tab*0            Sig: Take 1 tablet by mouth three times daily as           needed for sleepiness.    amphetamine-dextroamphetamine (ADDERALL) *90 tab*0            Sig: Take 1 tablet by mouth three times daily as           needed for sleepiness.    amphetamine-dextroamphetamine (ADDERALL) *90 tab*0            Sig: Take 1 tablet by mouth three times daily as           needed for sleepiness.         Last Written Prescription Date:  03/08/2019  Last Fill Quantity: 90 per 30 days,   # refills: 0  Last Office Visit with prescribing provider: 06/15/2018  Future Office visit:       Routing refill request to provider for review/approval because:  Drug not on the G, P or [facility] refill protocol or controlled substance

## 2019-03-25 NOTE — TELEPHONE ENCOUNTER
Todd called to check on status of this request. He was informed prescriptions were given to pharmacist.

## 2019-05-30 DIAGNOSIS — G47.11 IDIOPATHIC HYPERSOMNIA WITH LONG SLEEP TIME: ICD-10-CM

## 2019-05-30 NOTE — TELEPHONE ENCOUNTER
Chief Complaint   Patient presents with     Refill Request     amphetamine-dextroamphetamine (ADDERALL) 20 MG tablet      Refill request received via phone by patient or caregiver       Todd requests prescriptions be mailed via certified mail   No prescriptions requested or ordered in this encounter         Last Written Prescription Date:  6/3/19  Last Fill Quantity: 90,   # refills: 0  Last Office Visit with prescribing provider: 6/15/18  Future Office visit: None- Pt is aware he is due to schedule. He is moving and may have to establish care somewhere else.      Routing refill request to provider for review/approval because:  Drug not on the G, P or [facility] refill protocol or controlled substance

## 2019-05-31 RX ORDER — DEXTROAMPHETAMINE SACCHARATE, AMPHETAMINE ASPARTATE, DEXTROAMPHETAMINE SULFATE AND AMPHETAMINE SULFATE 5; 5; 5; 5 MG/1; MG/1; MG/1; MG/1
TABLET ORAL
Qty: 90 TABLET | Refills: 0 | Status: SHIPPED | OUTPATIENT
Start: 2019-07-03 | End: 2019-10-29

## 2019-05-31 RX ORDER — DEXTROAMPHETAMINE SACCHARATE, AMPHETAMINE ASPARTATE, DEXTROAMPHETAMINE SULFATE AND AMPHETAMINE SULFATE 5; 5; 5; 5 MG/1; MG/1; MG/1; MG/1
TABLET ORAL
Qty: 90 TABLET | Refills: 0 | Status: SHIPPED | OUTPATIENT
Start: 2019-08-02 | End: 2019-10-29

## 2019-05-31 NOTE — TELEPHONE ENCOUNTER
Written Rx's received dated 7/3/19 and 8/2/19. Called and left a message for pt to let us know which pharmacy they would like them mailed to. Pt should be reminded to establish care with a new sleep provider after he moves, or make a follow up appt with Dr. Abraham.      Shelia Hou, CMA

## 2019-06-19 ENCOUNTER — TRANSFERRED RECORDS (OUTPATIENT)
Dept: HEALTH INFORMATION MANAGEMENT | Facility: CLINIC | Age: 29
End: 2019-06-19

## 2019-06-19 LAB
CREAT SERPL-MCNC: 1.14 MG/DL (ref 0.7–1.3)
GFR SERPL CREATININE-BSD FRML MDRD: >60 ML/MIN/1.73M2
GLUCOSE SERPL-MCNC: 97 MG/DL (ref 70–110)
POTASSIUM SERPL-SCNC: 3.7 MMOL/L (ref 3.5–5.1)

## 2019-06-25 NOTE — TELEPHONE ENCOUNTER
Pt returned call and wants the RX's mailed to the [facility] in [place], [place]. [address]    RX's have been sent as certified mail.    Shelia Hou, CMA

## 2019-09-17 DIAGNOSIS — G47.11 IDIOPATHIC HYPERSOMNIA WITH LONG SLEEP TIME: ICD-10-CM

## 2019-09-17 NOTE — TELEPHONE ENCOUNTER
amphetamine-dextroamphetamine (ADDERALL) 20 MG   Last Written Prescription Date: 8/2/19  Last Fill Quantity: 90,   # refills: 0  Last Office Visit: 6/15/18  Future Office visit: Due back in clinic        Routing refill request to provider for review/approval because:  Drug not on the [facility], P or [facility] refill protocol or controlled substance

## 2019-09-19 RX ORDER — DEXTROAMPHETAMINE SACCHARATE, AMPHETAMINE ASPARTATE, DEXTROAMPHETAMINE SULFATE AND AMPHETAMINE SULFATE 5; 5; 5; 5 MG/1; MG/1; MG/1; MG/1
TABLET ORAL
Qty: 90 TABLET | Refills: 0 | Status: SHIPPED | OUTPATIENT
Start: 2019-09-19 | End: 2019-10-29

## 2019-10-24 ENCOUNTER — PRE VISIT (OUTPATIENT)
Dept: NEUROLOGY | Facility: CLINIC | Age: 29
End: 2019-10-24

## 2019-10-24 NOTE — TELEPHONE ENCOUNTER
FUTURE VISIT INFORMATION      FUTURE VISIT INFORMATION:    Date: 11/25/2019    Time: 8AM    [place] [facility] [place]ERRAL INFORMATION:    Referring provider:  Self     Referring providers clinic:      Reason for visit/diagnosis  Back pain with numbness     RECORDS REQUESTED FROM:       Clinic name Comments Records Status Imaging Status   [facility]  Care Everywhere N/A    [facility]  Care Everywhere N/A    [facility]  Care Everywhere N/A

## 2019-10-29 DIAGNOSIS — G47.11 IDIOPATHIC HYPERSOMNIA WITH LONG SLEEP TIME: ICD-10-CM

## 2019-10-29 RX ORDER — DEXTROAMPHETAMINE SACCHARATE, AMPHETAMINE ASPARTATE, DEXTROAMPHETAMINE SULFATE AND AMPHETAMINE SULFATE 5; 5; 5; 5 MG/1; MG/1; MG/1; MG/1
TABLET ORAL
Qty: 90 TABLET | Refills: 0 | Status: SHIPPED | OUTPATIENT
Start: 2019-12-29 | End: 2020-01-20

## 2019-10-29 RX ORDER — DEXTROAMPHETAMINE SACCHARATE, AMPHETAMINE ASPARTATE, DEXTROAMPHETAMINE SULFATE AND AMPHETAMINE SULFATE 5; 5; 5; 5 MG/1; MG/1; MG/1; MG/1
TABLET ORAL
Qty: 90 TABLET | Refills: 0 | Status: SHIPPED | OUTPATIENT
Start: 2019-11-29 | End: 2020-01-20

## 2019-10-29 RX ORDER — DEXTROAMPHETAMINE SACCHARATE, AMPHETAMINE ASPARTATE, DEXTROAMPHETAMINE SULFATE AND AMPHETAMINE SULFATE 5; 5; 5; 5 MG/1; MG/1; MG/1; MG/1
TABLET ORAL
Qty: 90 TABLET | Refills: 0 | Status: SHIPPED | OUTPATIENT
Start: 2019-10-29 | End: 2020-01-20

## 2019-10-29 NOTE — TELEPHONE ENCOUNTER
Adderall      Last Written Prescription Date:  9/19/19  Last Fill Quantity: 90,   # refills: 0  Last Office Visit: 6/15/18  Future Office visit:       Routing refill request to provider for review/approval because:  Drug not on the [facility], [facility] or  Health refill protocol or controlled substance    Pt overdue for yearly check. He was called and notified no more refills after these until seen .  He can also establish care where he now lives

## 2019-11-03 ENCOUNTER — HEALTH MAINTENANCE LETTER (OUTPATIENT)
Age: 29
End: 2019-11-03

## 2019-11-25 ENCOUNTER — OFFICE VISIT (OUTPATIENT)
Dept: NEUROLOGY | Facility: CLINIC | Age: 29
End: 2019-11-25
Payer: MEDICAID

## 2019-11-25 VITALS
WEIGHT: 163 LBS | HEART RATE: 80 BPM | SYSTOLIC BLOOD PRESSURE: 160 MMHG | OXYGEN SATURATION: 99 % | RESPIRATION RATE: 15 BRPM | BODY MASS INDEX: 23.34 KG/M2 | DIASTOLIC BLOOD PRESSURE: 89 MMHG | HEIGHT: 70 IN

## 2019-11-25 DIAGNOSIS — M79.605 PAIN OF LEFT LOWER EXTREMITY: Primary | ICD-10-CM

## 2019-11-25 DIAGNOSIS — F32.A DEPRESSION, UNSPECIFIED DEPRESSION TYPE: ICD-10-CM

## 2019-11-25 DIAGNOSIS — R20.0 EXTREMITY NUMBNESS: ICD-10-CM

## 2019-11-25 ASSESSMENT — PATIENT HEALTH QUESTIONNAIRE - PHQ9: SUM OF ALL RESPONSES TO PHQ QUESTIONS 1-9: 21

## 2019-11-25 ASSESSMENT — PAIN SCALES - GENERAL: PAINLEVEL: SEVERE PAIN (6)

## 2019-11-25 ASSESSMENT — MIFFLIN-ST. JEOR: SCORE: 1715.61

## 2019-11-25 NOTE — PROGRESS NOTES
Service Date: 2019      Sayda Hou MD   [address], [address] [address]      RE: Todd Delgado   MRN: [id number]   : 1990      Dear Dr. Llamas:      I saw your patient, Todd Delgado on 2019.  He is a 29-year-old male here for evaluation of chronic left leg burning and pain, intermittent left arm and left leg weakness and paresthesias involving both upper extremities and the left leg.      About 10 years ago, he fell off a skateboard and landed on his left hip.  At some point after this, he began experiencing symptoms, although it is unclear if it clearly related to the injury.      He reports an intermittent burning pain in his left leg extending from the gluteal region down the back of the leg to the foot.  He also reports intermittent paresthesias and weakness involving the left arm and left leg.  This can last several days.  Occasionally, he will have similar symptoms in his right arm.  He reports pain in his hands and joints.  He reports low back pain and hip pain.  He describes his neck as feeling sore.  He does not describe a Lhermitte phenomenon.  He reports some urinary urgency but no issue with bowel or bladder control.      He did have a lumbar MRI scan done 2017 that I reviewed.  It is an unremarkable study.  There is some disk degeneration at L2-L3 and at L4-L5, but no central canal or foraminal narrowing.      The patient also is probably depressed.  He scored high on screening for this today.  His wife, Mena, who accompanied him, indicates that this has developed more recently.  He is not on any care for depression.  He denies any thoughts of self-harm.      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  He also has idiopathic hypersomnolence.      Medications are Adderall 20 mg 3 times a day as needed.  He is on no other medications.      ALLERGIES:  He is allergic to ceftriaxone.       Family history is  notable for his mother having had spinal surgery.  He describes her as having degenerative disk disease.      He works as cleaning air ducts and carpet.  He smokes E-cigarettes.  He rarely uses alcohol.      Examination reveals a quiet male.  Heart rate 80.  Blood pressure initially was 160/89.  I rechecked it with a large cuff and it was 120/78.  Pupils are equal, round and react well to light.  He has full extraocular motility.  He has normal facial strength and sensation.  He has a loss of hearing from the left ear.  Otherwise, cranial nerves II-XII are intact.  Motor examination reveals he tends to break when I test ankle dorsiflexion bilaterally.  Otherwise, he has good strength.  On sensory testing, he has intact position sense and vibratory sense.  He reports decreased pinprick involving the tips of the second through fifth digits of the right hand and the first through third digits of the left hand.  He also reports decreased pinprick involving the sole of the left foot.  Finger-to-nose is done well.  His gait is slightly antalgic.  His muscle tone is normal.  Reflexes are 1+ in the upper extremities and 2+ in lower extremities.  Plantar responses are flexor.      IMPRESSION:   1.  Chronic left lower extremity pain.   2.  Episodic left arm and left leg weakness.   3.  Intermittent numbness and tingling involving the right and left upper extremity.      PLAN:  I did review the lumbar MRI scan done in 06/2017.  It does not show changes that would explain his symptoms.      I have recommended further evaluation.  He is going to have EMG and nerve conduction studies done of the upper extremities and the left leg.      I am also going to check a cervical MRI scan to make certain we are not dealing with a myelopathic process, although there are no clear findings of myelopathy on examination.      I will be seeing him back to review test results.      I did discuss his depression.  He for now is going to look into  resources closer to where he lives, although I could offer him a referral to Psychiatry here if he chooses to do so.     ADDENDUM 19: EMG studies normal. Cervical MRI unremarkable. Spinal cord normal. No canal or foraminal narrowing. Discussed with patient. Unfortunately negative studies. I can see him back as scheduled but may not have anything further to add. He is planning to establish care with a psychiatrist     Sincerely,       MD CLARK Mcrae MD             D: 2019   T: 2019   MT: AKA      Name:     RADHA KAPADIA   MRN:      [id number]        Account:      [id number]   :      1990           Service Date: 2019      Document: [id number]

## 2019-11-25 NOTE — NURSING NOTE
Chief Complaint   Patient presents with     Back Pain     Numbness     [facility] NEW CONSULTATION     Depression Response    Patient completed the PHQ-9 assessment for depression and scored >9? Yes  Question 9 on the PHQ-9 was positive for suicidality? No    I personally notified the following: clinic nurse             Sly Gutierrez, EMT

## 2019-11-25 NOTE — LETTER
2019       RE: Todd Delgado  [address] [facility] [address]     Dear Colleague,    Thank you for referring your patient, Todd Delgado, to the [facility] at [facility]. Please see a copy of my visit note below.    Service Date: 2019      Sayda Hou MD   [address]   [place] [address]      RE: Todd Delgado   MRN: [id number]   : 1990      Dear Dr. Llamas:      I saw your patient, Todd Delgado on 2019.  He is a 29-year-old male here for evaluation of chronic left leg burning and pain, intermittent left arm and left leg weakness and paresthesias involving both upper extremities and the left leg.      About 10 years ago, he fell off a skateboard and landed on his left hip.  At some point after this, he began experiencing symptoms, although it is unclear if it clearly related to the injury.      He reports an intermittent burning pain in his left leg extending from the gluteal region down the back of the leg to the foot.  He also reports intermittent paresthesias and weakness involving the left arm and left leg.  This can last several days.  Occasionally, he will have similar symptoms in his right arm.  He reports pain in his hands and joints.  He reports low back pain and hip pain.  He describes his neck as feeling sore.  He does not describe a Lhermitte phenomenon.  He reports some urinary urgency but no issue with bowel or bladder control.      He did have a lumbar MRI scan done 2017 that I reviewed.  It is an unremarkable study.  There is some disk degeneration at L2-L3 and at L4-L5, but no central canal or foraminal narrowing.      The patient also is probably depressed.  He scored high on screening for this today.  His wife, Mena, who accompanied him, indicates that this has developed more recently.  He is not on any care for depression.  He denies any thoughts of self-harm.      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  He also has idiopathic hypersomnolence.      Medications are Adderall 20 mg 3 times a day as needed.  He is on no other medications.      ALLERGIES:  He is allergic to ceftriaxone.       Family history is notable for his mother having had spinal surgery.  He describes her as having degenerative disk disease.      He works as cleaning air ducts and carpet.  He smokes E-cigarettes.  He rarely uses alcohol.      Examination reveals a quiet male.  Heart rate 80.  Blood pressure initially was 160/89.  I rechecked it with a large cuff and it was 120/78.  Pupils are equal, round and react well to light.  He has full extraocular motility.  He has normal facial strength and sensation.  He has a loss of hearing from the left ear.  Otherwise, cranial nerves II-XII are intact.  Motor examination reveals he tends to break when I test ankle dorsiflexion bilaterally.  Otherwise, he has good strength.  On sensory testing, he has intact position sense and vibratory sense.  He reports decreased pinprick involving the tips of the second through fifth digits of the right hand and the first through third digits of the left hand.  He also reports decreased pinprick involving the sole of the left foot.  Finger-to-nose is done well.  His gait is slightly antalgic.  His muscle tone is normal.  Reflexes are 1+ in the upper extremities and 2+ in lower extremities.  Plantar responses are flexor.      IMPRESSION:   1.  Chronic left lower extremity pain.   2.  Episodic left arm and left leg weakness.   3.  Intermittent numbness and tingling involving the right and left upper extremity.      PLAN:  I did review the lumbar MRI scan done in 06/2017.  It does not show changes that would explain his symptoms.      I have recommended further evaluation.  He is going to have EMG and nerve conduction studies done of the upper extremities and the left leg.      I am also going to check a cervical MRI  scan to make certain we are not dealing with a myelopathic process, although there are no clear findings of myelopathy on examination.      I will be seeing him back to review test results.      I did discuss his depression.  He for now is going to look into resources closer to where he lives, although I could offer him a referral to Psychiatry here if he chooses to do so.      Sincerely,       MD MORGAN Mcrae MD             D: 2019   T: 2019   MT: AKA      Name:     RADHA KAPADIA   MRN:      [id number]        Account:      [id number]   :      1990           Service Date: 2019      Document: [id number]        Again, thank you for allowing me to participate in the care of your patient.      Sincerely,    Morgan Lott MD

## 2019-11-25 NOTE — NURSING NOTE
[facility]:  PHQ-9 Screening Note    SITUATION/BACKGROUND                                                    Todd Delgado is a 29 year old male who completed the PHQ-9 assessment for depression and Score is >9.    Onset of symptoms: worsening  Trigger: nerve pain,numbness  Recent related events: none  Prior history of suicide attempt or self harm: No  Medications reviewed: yes   ASSESSMENT      A. Are any of the following present?      Suicidal thoughts with a plan and means to carry out the plan?    Intent to harm others    Altered mental status: confusion, delusional, psychotic   NO - go to B   B. Are any of the following present?      Suicidal thoughts without a plan or means to carry out the plan    New onset of delusional ideas    Past inpatient admission for depression    New onset and recent change or addition of new medication   NO - go to C   C. Are any of the following present?      Previous suicide attempts    Depression interfering with ability to work or function    Loss of appetite and eating poorly    Abrupt cessation of drugs (OTC or RX), alcohol or caffeine    Drug or alcohol abuse   NO - go to D   D. Are several of the following present?      Difficulty concentrating    Difficulty sleeping    Reduced interest in sexual activity or impotency    Irregular or absent menstruation    No interest in activity    Change in interpersonal relationships    Increased use/abuse of alcohol or drugs    Pregnant or recent child birth    Recent major life change    History of depression        PLAN      Home Care Instructions:   Call local crisis interventions    Report the following to your PCP:   Depression interferes with daily activities    Seek emergency care immediately if any of the following occur:       BEHAVIORAL HEALTH TEAMS      [facility] - Behavioral Health Team    [facility] Pager: [phone]    [place]  - Behavioral Health Team    Pager number: [phone]    Referral to Behavioral  Health    BEHAVIORAL / SPIRITUAL HEALTH SOWQ [02765}    RESOURCES      - 24/7 Crisis Hotlines: National Suicide Prevention Hotline  073-786-ZMVP (7777)    Guera Guidry RN        Copyright 2016 [facility]

## 2019-11-25 NOTE — LETTER
RE: Todd Delgado  [address] [facility] [address]     Service Date: 2019      Sayda Hou MD   [facility]    [address] [facility], [address] [address]      RE: Todd Delgado   MRN: [id number]   : 1990      Dear Dr. Llamas:      I saw your patient, Todd Delgado on 2019.  He is a 29-year-old male here for evaluation of chronic left leg burning and pain, intermittent left arm and left leg weakness and paresthesias involving both upper extremities and the left leg.      About 10 years ago, he fell off a skateboard and landed on his left hip.  At some point after this, he began experiencing symptoms, although it is unclear if it clearly related to the injury.      He reports an intermittent burning pain in his left leg extending from the gluteal region down the back of the leg to the foot.  He also reports intermittent paresthesias and weakness involving the left arm and left leg.  This can last several days.  Occasionally, he will have similar symptoms in his right arm.  He reports pain in his hands and joints.  He reports low back pain and hip pain.  He describes his neck as feeling sore.  He does not describe a Lhermitte phenomenon.  He reports some urinary urgency but no issue with bowel or bladder control.      He did have a lumbar MRI scan done 2017 that I reviewed.  It is an unremarkable study.  There is some disk degeneration at L2-L3 and at L4-L5, but no central canal or foraminal narrowing.      The patient also is probably depressed.  He scored high on screening for this today.  His wife, Mena, who accompanied him, indicates that this has developed more recently.  He is not on any care for depression.  He denies any thoughts of self-harm.      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  He also has idiopathic hypersomnolence.      Medications are Adderall 20 mg 3 times a day as needed.  He is on no other medications.      ALLERGIES:  He  is allergic to ceftriaxone.       Family history is notable for his mother having had spinal surgery.  He describes her as having degenerative disk disease.      He works as cleaning air ducts and carpet.  He smokes E-cigarettes.  He rarely uses alcohol.      Examination reveals a quiet male.  Heart rate 80.  Blood pressure initially was 160/89.  I rechecked it with a large cuff and it was 120/78.  Pupils are equal, round and react well to light.  He has full extraocular motility.  He has normal facial strength and sensation.  He has a loss of hearing from the left ear.  Otherwise, cranial nerves II-XII are intact.  Motor examination reveals he tends to break when I test ankle dorsiflexion bilaterally.  Otherwise, he has good strength.  On sensory testing, he has intact position sense and vibratory sense.  He reports decreased pinprick involving the tips of the second through fifth digits of the right hand and the first through third digits of the left hand.  He also reports decreased pinprick involving the sole of the left foot.  Finger-to-nose is done well.  His gait is slightly antalgic.  His muscle tone is normal.  Reflexes are 1+ in the upper extremities and 2+ in lower extremities.  Plantar responses are flexor.      IMPRESSION:   1.  Chronic left lower extremity pain.   2.  Episodic left arm and left leg weakness.   3.  Intermittent numbness and tingling involving the right and left upper extremity.      PLAN:  I did review the lumbar MRI scan done in 06/2017.  It does not show changes that would explain his symptoms.      I have recommended further evaluation.  He is going to have EMG and nerve conduction studies done of the upper extremities and the left leg.      I am also going to check a cervical MRI scan to make certain we are not dealing with a myelopathic process, although there are no clear findings of myelopathy on examination.      I will be seeing him back to review test results.      I did  discuss his depression.  He for now is going to look into resources closer to where he lives, although I could offer him a referral to Psychiatry here if he chooses to do so.      Sincerely,       Morgan Lott MD     D: 2019   T: 2019   MT: AKA    Name:     RADHA KAPADIA   MRN:      [id number]        Account:      [id number]   :      1990           Service Date: 2019    Document: [id number]

## 2019-12-02 ENCOUNTER — OFFICE VISIT (OUTPATIENT)
Dept: NEUROLOGY | Facility: CLINIC | Age: 29
End: 2019-12-02
Attending: PSYCHIATRY & NEUROLOGY
Payer: MEDICAID

## 2019-12-02 ENCOUNTER — HOSPITAL ENCOUNTER (OUTPATIENT)
Dept: MRI IMAGING | Facility: CLINIC | Age: 29
Discharge: HOME | End: 2019-12-02
Attending: PSYCHIATRY & NEUROLOGY | Admitting: PSYCHIATRY & NEUROLOGY
Payer: MEDICAID

## 2019-12-02 DIAGNOSIS — R20.2 ARM PARESTHESIA, RIGHT: ICD-10-CM

## 2019-12-02 DIAGNOSIS — M79.605 PAIN OF LEFT LOWER EXTREMITY: ICD-10-CM

## 2019-12-02 DIAGNOSIS — M54.50 CHRONIC LEFT-SIDED LOW BACK PAIN WITHOUT SCIATICA: Primary | ICD-10-CM

## 2019-12-02 DIAGNOSIS — R20.0 EXTREMITY NUMBNESS: ICD-10-CM

## 2019-12-02 DIAGNOSIS — G89.29 CHRONIC LEFT-SIDED LOW BACK PAIN WITHOUT SCIATICA: Primary | ICD-10-CM

## 2019-12-02 PROCEDURE — A9585 GADOBUTROL INJECTION: HCPCS | Performed by: PSYCHIATRY & NEUROLOGY

## 2019-12-02 PROCEDURE — 72156 MRI NECK SPINE W/O & W/DYE: CPT

## 2019-12-02 PROCEDURE — 25500064 ZZH RX 255 OP 636: Performed by: PSYCHIATRY & NEUROLOGY

## 2019-12-02 RX ORDER — GADOBUTROL 604.72 MG/ML
7.5 INJECTION INTRAVENOUS ONCE
Status: COMPLETED | OUTPATIENT
Start: 2019-12-02 | End: 2019-12-02

## 2019-12-02 RX ADMIN — GADOBUTROL 7.4 ML: 604.72 INJECTION INTRAVENOUS at 11:38

## 2019-12-02 NOTE — PROGRESS NOTES
[facility]  Electrodiagnostic Laboratory    Nerve Conduction & EMG Report          Patient: Todd Delgado YOB: 1990  Patient ID: [id number] Age: 29 Years 5 Months  Gender: Male      History & Examination:  29 year old man with chronic left leg, intermittent left arm and left leg weakness, and paresthesias involving both upper extremities and the left leg. Eval for neuropathy or radiculopathy.     Techniques:  Sensory and motor conduction studies were done with surface recording electrodes. EMG was done with a concentric needle electrode.     Results:  Nerve conduction studies:  1. Bilateral median-D2, bilateral ulnar-D5, left radial, left sural, and left superficial peroneal sensory responses are normal.   2. Bilateral median-APB, bilateral ulnar-ADM, left peroneal-EDB, and left tibial-AH motor responses are normal.     Needle EMG of selected left proximal and distal right upper and lower limb muscles was performed as tabulated below. No abnormal spontaneous activity was observed in the sampled muscles. Motor unit potential morphology and recruitment patterns were normal.     Interpretation:  This is a normal study. There is no electrophysiologic evidence of a large fiber polyneuropathy, focal neuropathy or radiculopathy affecting the upper or lower limbs on the basis of this study.     Chavez Spain MD  Department of Neurology        Sensory NCS      Nerve / Sites Rec. Site Onset Peak NP Amp Ref. PP Amp Dist Harmeet Ref. Temp     ms ms  V  V  V cm m/s m/s  C   L MEDIAN - Dig II Anti      Wrist Dig II 2.40 3.39 25.8 10.0 41.5 14 58.4 48.0 32.5   R MEDIAN - Dig II Anti      Wrist Dig II 2.29 3.18 26.1 10.0 44.0 14 61.1 48.0 32.4   L ULNAR - Dig V Anti      Wrist Dig V 2.50 3.49 22.4 8.0 41.1 12.5 50.0 48.0 32.4   R ULNAR - Dig V Anti      Wrist Dig V 2.40 3.39 27.7 8.0 43.6 12.5 52.2 48.0 32.4   L RADIAL - Snuff      Forearm Snuff 1.67 2.19 46.2 15.0 84.5 10 60.0 48.0 32.5   L SURAL - Lat  Mall      Calf Ankle 2.66 3.33 15.3 8.0 19.6 14 52.7 38.0 31.4   L SUP PERONEAL      Lat Leg Rodriguez 2.45 3.23 11.6  11.2 12.5 51.1 38.0 31.4       Motor NCS      Nerve / Sites Rec. Site Lat Ref. Amp Ref. Rel Amp Dist Harmeet Ref. Dur. Area Temp.     ms ms mV mV % cm m/s m/s ms %  C   L MEDIAN - APB      Wrist APB 3.33 4.40 12.6 5.0 100 8   6.04 100 32.5      Elbow APB 7.66  12.3  97.3 25 57.8 48.0 6.35 91.3 32.5   R MEDIAN - APB      Wrist APB 3.02 4.40 12.8 5.0 100 8   6.72 100 32.5      Elbow APB 7.24  12.4  96.6 24 56.9 48.0 6.77 92.3 32.5   L ULNAR - ADM      Wrist ADM 2.66 3.50 11.8 5.0 100 8   7.81 100 32.5      B.Elbow ADM 5.47  11.7  99.6 18.5 65.8 48.0 7.66 101 32.5      A.Elbow ADM 6.98  11.2  95 10 66.2 48.0 8.07 94.7 32.5   R ULNAR - ADM      Wrist ADM 2.71 3.50 16.8 5.0 100 8   6.93 100 32.4      B.Elbow ADM 5.63  16.2  96.7 19 65.1 48.0 6.93 98.8 32.4      A.Elbow ADM 7.29  15.4  91.4 10 60.0 48.0 7.08 95.7 32.4   L DEEP PERONEAL - EDB      Ankle EDB 4.27 6.00 5.0 2.5 100 8   5.68 100 31.4      FibHead EDB 9.64  4.5  89.6   38.0 5.89 89.9 31.4      Pop Fos EDB 11.46  4.5  90.6   38.0 6.20 91.1 31.4   L TIBIAL - AH      Ankle AH 3.23 6.00 20.5 4.0 100 8   5.63 100 31.4      Pop Fos AH 10.63  20.3  99.2 38 51.4 38.0 6.67 99.3 31.4       EMG Summary Table     Spontaneous MUAP Recruitment    IA Fib/PSW Fasc H.F. Amp Dur. PPP Pattern   L. VAST LATERALIS N None None None N N None Normal   L. TIB ANTERIOR N None None None N N None Normal   L. GASTROCN (MED) N None None None N N None Normal   L. TIB POSTERIOR N None None None N N None Normal   L. PERON LONGUS N None None None N N None Normal   L. DELTOID N None None None N N None Normal   L. TRICEPS N None None None N N None Normal   L. FIRST D INTEROSS N None None None N N None Normal

## 2019-12-02 NOTE — LETTER
12/2/2019       RE: Todd Delgado  [address] [facility] [address]     Dear Colleague,    Thank you for referring your patient, Todd Delgado, to the [facility] EMG at [facility]. Please see a copy of my visit note below.        [facility]  Electrodiagnostic Laboratory    Nerve Conduction & EMG Report          Patient: Todd Delgado YOB: 1990  Patient ID: [id number] Age: 29 Years 5 Months  Gender: Male      History & Examination:  29 year old man with chronic left leg, intermittent left arm and left leg weakness, and paresthesias involving both upper extremities and the left leg. Eval for neuropathy or radiculopathy.     Techniques:  Sensory and motor conduction studies were done with surface recording electrodes. EMG was done with a concentric needle electrode.     Results:  Nerve conduction studies:  1. Bilateral median-D2, bilateral ulnar-D5, left radial, left sural, and left superficial peroneal sensory responses are normal.   2. Bilateral median-APB, bilateral ulnar-ADM, left peroneal-EDB, and left tibial-AH motor responses are normal.     Needle EMG of selected left proximal and distal right upper and lower limb muscles was performed as tabulated below. No abnormal spontaneous activity was observed in the sampled muscles. Motor unit potential morphology and recruitment patterns were normal.     Interpretation:  This is a normal study. There is no electrophysiologic evidence of a large fiber polyneuropathy, focal neuropathy or radiculopathy affecting the upper or lower limbs on the basis of this study.     Chavez Spain MD  Department of Neurology        Sensory NCS      Nerve / Sites Rec. Site Onset Peak NP Amp Ref. PP Amp Dist Harmeet Ref. Temp     ms ms  V  V  V cm m/s m/s  C   L MEDIAN - Dig II Anti      Wrist Dig II 2.40 3.39 25.8 10.0 41.5 14 58.4 48.0 32.5   R MEDIAN - Dig II Anti      Wrist Dig II 2.29 3.18 26.1 10.0 44.0 14 61.1 48.0 32.4   L ULNAR  - Dig V Anti      Wrist Dig V 2.50 3.49 22.4 8.0 41.1 12.5 50.0 48.0 32.4   R ULNAR - Dig V Anti      Wrist Dig V 2.40 3.39 27.7 8.0 43.6 12.5 52.2 48.0 32.4   L RADIAL - Snuff      Forearm Snuff 1.67 2.19 46.2 15.0 84.5 10 60.0 48.0 32.5   L SURAL - Lat Mall      Calf Ankle 2.66 3.33 15.3 8.0 19.6 14 52.7 38.0 31.4   L SUP PERONEAL      Lat Leg Rodriguez 2.45 3.23 11.6  11.2 12.5 51.1 38.0 31.4       Motor NCS      Nerve / Sites Rec. Site Lat Ref. Amp Ref. Rel Amp Dist Harmeet Ref. Dur. Area Temp.     ms ms mV mV % cm m/s m/s ms %  C   L MEDIAN - APB      Wrist APB 3.33 4.40 12.6 5.0 100 8   6.04 100 32.5      Elbow APB 7.66  12.3  97.3 25 57.8 48.0 6.35 91.3 32.5   R MEDIAN - APB      Wrist APB 3.02 4.40 12.8 5.0 100 8   6.72 100 32.5      Elbow APB 7.24  12.4  96.6 24 56.9 48.0 6.77 92.3 32.5   L ULNAR - ADM      Wrist ADM 2.66 3.50 11.8 5.0 100 8   7.81 100 32.5      B.Elbow ADM 5.47  11.7  99.6 18.5 65.8 48.0 7.66 101 32.5      A.Elbow ADM 6.98  11.2  95 10 66.2 48.0 8.07 94.7 32.5   R ULNAR - ADM      Wrist ADM 2.71 3.50 16.8 5.0 100 8   6.93 100 32.4      B.Elbow ADM 5.63  16.2  96.7 19 65.1 48.0 6.93 98.8 32.4      A.Elbow ADM 7.29  15.4  91.4 10 60.0 48.0 7.08 95.7 32.4   L DEEP PERONEAL - EDB      Ankle EDB 4.27 6.00 5.0 2.5 100 8   5.68 100 31.4      FibHead EDB 9.64  4.5  89.6   38.0 5.89 89.9 31.4      Pop Fos EDB 11.46  4.5  90.6   38.0 6.20 91.1 31.4   L TIBIAL - AH      Ankle AH 3.23 6.00 20.5 4.0 100 8   5.63 100 31.4      Pop Fos AH 10.63  20.3  99.2 38 51.4 38.0 6.67 99.3 31.4       EMG Summary Table     Spontaneous MUAP Recruitment    IA Fib/PSW Fasc H.F. Amp Dur. PPP Pattern   L. VAST LATERALIS N None None None N N None Normal   L. TIB ANTERIOR N None None None N N None Normal   L. GASTROCN (MED) N None None None N N None Normal   L. TIB POSTERIOR N None None None N N None Normal   L. PERON LONGUS N None None None N N None Normal   L. DELTOID N None None None N N None Normal   L. TRICEPS N None None None  N N None Normal   L. FIRST D INTEROSS N None None None N N None Normal                                        Again, thank you for allowing me to participate in the care of your patient.      Sincerely,    Chavez Spain MD

## 2019-12-05 ENCOUNTER — TELEPHONE (OUTPATIENT)
Dept: NEUROLOGY | Facility: CLINIC | Age: 29
End: 2019-12-05

## 2019-12-05 NOTE — TELEPHONE ENCOUNTER
[facility] Call Center    Phone Message    May a detailed message be left on voicemail: yes    Reason for Call: Requesting Results   Name/type of test: EMG, MRI  Date of test: 12/2  Was test done at a location other than [facility] (Please fill in the location if not [facility])?: No      Action Taken: Message routed to:  [facility] ([facility]): sheldon

## 2019-12-20 ENCOUNTER — OFFICE VISIT (OUTPATIENT)
Dept: NEUROLOGY | Facility: CLINIC | Age: 29
End: 2019-12-20
Payer: MEDICAID

## 2019-12-20 VITALS
HEART RATE: 89 BPM | DIASTOLIC BLOOD PRESSURE: 88 MMHG | RESPIRATION RATE: 15 BRPM | SYSTOLIC BLOOD PRESSURE: 134 MMHG | BODY MASS INDEX: 23.05 KG/M2 | HEIGHT: 70 IN | WEIGHT: 161 LBS | OXYGEN SATURATION: 99 %

## 2019-12-20 DIAGNOSIS — M79.605 PAIN OF LEFT LOWER EXTREMITY: Primary | ICD-10-CM

## 2019-12-20 ASSESSMENT — MIFFLIN-ST. JEOR: SCORE: 1706.54

## 2019-12-20 ASSESSMENT — PAIN SCALES - GENERAL: PAINLEVEL: MODERATE PAIN (4)

## 2019-12-20 NOTE — LETTER
Date:December 23, 2019      Patient was self referred, no letter generated. Do not send.        [facility] Health Information

## 2019-12-20 NOTE — LETTER
12/20/2019       RE: Todd Delgado  [address] [facility] [address]     Dear Colleague,    Thank you for referring your patient, Todd Delgado, to the [facility] NEUROLOGY at [facility]. Please see a copy of my visit note below.    Service Date: 12/20/2019      INTERVAL HISTORY:   Todd Delgado returns to review test results.  He is a patient I saw a month ago for evaluation of chronic left lower extremity pain, episodic left arm and left leg weakness and intermittent numbness and tingling involving the right and left upper extremity.      A couple years ago he had had a lumbar MRI scan done that revealed some disk degeneration but no canal stenosis or foraminal narrowing.      He went on to have a cervical MRI scan after I saw him.  I discussed the results with him by phone and again reviewed them today.  There is some disk bulging but no significant canal stenosis or foraminal stenosis.  He also had bilateral upper extremity nerve conduction studies were normal.  Left lower extremity EMG and nerve conduction velocity studies were normal as well.      Unfortunately, I have not come up with a precise cause for his symptoms and I did explain this to him.      When I did see him a month ago, it was clear that he was depressed as he scored high on depression screening.  He is going to be setting up an appointment to see a psychiatric provider in [place] and I encouraged him to follow through with this.      I touched upon gabapentin for his pain, but I am very reluctant to utilize the drug because of the risk for exacerbating his depression and I did explain this to him today.  Perhaps if his depression is brought under good control this might be considered.      He did bring me a form from work concerning his work ability.  I stated that he does have chronic left leg pain but workup for this has been unrevealing.  I indicated he could return to work as tolerated but did not list any  specific restrictions.        Radha can follow up with me in the future as needed.      MD MORGAN Mcrae MD             D: 2019   T: 2019   MT: AKA      Name:     RADHA KAPADIA   MRN:      [id number]        Account:      [id number]   :      1990           Service Date: 2019      Document: [id number]        Again, thank you for allowing me to participate in the care of your patient.      Sincerely,    Morgan Lott MD

## 2019-12-20 NOTE — PROGRESS NOTES
Service Date: 12/20/2019      INTERVAL HISTORY:   Radha Kapadia returns to review test results.  He is a patient I saw a month ago for evaluation of chronic left lower extremity pain, episodic left arm and left leg weakness and intermittent numbness and tingling involving the right and left upper extremity.      A couple years ago he had had a lumbar MRI scan done that revealed some disk degeneration but no canal stenosis or foraminal narrowing.      He went on to have a cervical MRI scan after I saw him.  I discussed the results with him by phone and again reviewed them today.  There is some disk bulging but no significant canal stenosis or foraminal stenosis.  He also had bilateral upper extremity nerve conduction studies were normal.  Left lower extremity EMG and nerve conduction velocity studies were normal as well.      Unfortunately, I have not come up with a precise cause for his symptoms and I did explain this to him.      When I did see him a month ago, it was clear that he was depressed as he scored high on depression screening.  He is going to be setting up an appointment to see a psychiatric provider in [place] and I encouraged him to follow through with this.      I touched upon gabapentin for his pain, but I am very reluctant to utilize the drug because of the risk for exacerbating his depression and I did explain this to him today.  Perhaps if his depression is brought under good control this might be considered.      He did bring me a form from work concerning his work ability.  I stated that he does have chronic left leg pain but workup for this has been unrevealing.  I indicated he could return to work as tolerated but did not list any specific restrictions.        Radha can follow up with me in the future as needed.      MD CLARK Mcrae MD             D: 12/20/2019   T: 12/20/2019   MT: AKA      Name:     RADHA KAPADIA   MRN:      [id number]        Account:       [id number]   :      1990           Service Date: 2019      Document: [id number]

## 2020-01-17 ENCOUNTER — OFFICE VISIT (OUTPATIENT)
Dept: FAMILY MEDICINE | Facility: CLINIC | Age: 30
End: 2020-01-17
Payer: COMMERCIAL

## 2020-01-17 VITALS
BODY MASS INDEX: 22.92 KG/M2 | OXYGEN SATURATION: 100 % | RESPIRATION RATE: 16 BRPM | TEMPERATURE: 98 F | HEIGHT: 70 IN | DIASTOLIC BLOOD PRESSURE: 70 MMHG | SYSTOLIC BLOOD PRESSURE: 108 MMHG | HEART RATE: 75 BPM | WEIGHT: 160.1 LBS

## 2020-01-17 DIAGNOSIS — F90.9 ATTENTION DEFICIT HYPERACTIVITY DISORDER (ADHD), UNSPECIFIED ADHD TYPE: ICD-10-CM

## 2020-01-17 DIAGNOSIS — Z01.818 PREOP GENERAL PHYSICAL EXAM: Primary | ICD-10-CM

## 2020-01-17 DIAGNOSIS — H90.72 MIXED CONDUCTIVE AND SENSORINEURAL HEARING LOSS OF LEFT EAR, UNSPECIFIED HEARING STATUS ON CONTRALATERAL SIDE: ICD-10-CM

## 2020-01-17 PROCEDURE — 99214 OFFICE O/P EST MOD 30 MIN: CPT | Performed by: FAMILY MEDICINE

## 2020-01-17 ASSESSMENT — MIFFLIN-ST. JEOR: SCORE: 1702.46

## 2020-01-17 NOTE — PROGRESS NOTES
[facility]  [id number] [place] [id number]  [phone]  Dept: [phone]    PRE-OP EVALUATION:  Today's date: 2020    Todd Delgado (: 1990) presents for pre-operative evaluation assessment as requested by Dr. Ortiz.  He requires evaluation and anesthesia risk assessment prior to undergoing surgery/procedure for treatment of inner ear .    Fax number for surgical facility: [phone]  Primary Physician: [facility]  Type of Anesthesia Anticipated: General    Patient has a Health Care Directive or Living Will:  NO    Preop Questions 2020   Who is doing your surgery? Dr. Ortiz with [facility]   What are you having done? re-connecting eardrum to inner ear   Date of Surgery/Procedure: 2020   Facility or Hospital where procedure/surgery will be performed: [facility]   1.  Do you have a history of Heart attack, stroke, stent, coronary bypass surgery, or other heart surgery? No   2.  Do you ever have any pain or discomfort in your chest? No   3.  Do you have a history of  Heart Failure? No   4.   Are you troubled by shortness of breath when:  walking on a level surface, or up a slight hill, or at night? No   5.  Do you currently have a cold, bronchitis or other respiratory infection? No   6.  Do you have a cough, shortness of breath, or wheezing? No   7.  Do you sometimes get pains in the calves of your legs when you walk? No   8. Do you or anyone in your family have previous history of blood clots? No   9.  Do you or does anyone in your family have a serious bleeding problem such as prolonged bleeding following surgeries or cuts? No   10. Have you ever had problems with anemia or been told to take iron pills? No   11. Have you had any abnormal blood loss such as black, tarry or bloody stools? No   12. Have you ever had a blood transfusion? No   13. Have you or any of your relatives ever had problems with  anesthesia? No   14. Do you have sleep apnea, excessive snoring or daytime drowsiness? No   15. Do you have any prosthetic heart valves? No   16. Do you have prosthetic joints? No         HPI:     HPI related to upcoming procedure:     Left ear, history of cholesteatoma repair.  History of recurrent ear infections.  Had disruption of the middle ear mechanisms, scheduled for follow-up repair with ENT.    Follow with psychiatry. History of ADHD on adderall.  Also recently started wellbutrin.       MEDICAL HISTORY:     Patient Active Problem List    Diagnosis Date Noted     Idiopathic hypersomnia with long sleep time 09/08/2017     Priority: Medium     Conductive hearing loss in right ear 03/23/2017     Priority: Medium     Mixed hearing loss of left ear 03/23/2017     Priority: Medium     Chronic low back pain 06/28/2016     Priority: Medium     Last Assessment & Plan:   A: Patient with history of acute on chronic low back pain that has been worse since Monday after injury at work. Currently taking Medrol Dosepak and tramadol for pain management prescribed by the ED. Notes ongoing pain today. No red flags or acutely concerning symptoms at this time. Physical exam shows lower lumbar tenderness to palpation. Discussed that pain is likely related to inflammation of a spine disk and would anticipate improvement with finishing medrol dosepak and taking NSAIDs. Patient expressed an understanding of this.    P:  - Continue Medrol Dosepak and Tramadol as prescribed  - Refilled Tramadol #20 QID PRN, advised patient to not to drive while taking this medication  - Recommended ibuprofen 600 mg QID PRN   - Work note provided to patient   - Advised patient to avoid heavy lifting  - Follow-up in 1 month, sooner if pain persists/worsens and can consider imaging at that time       Health care maintenance 06/28/2016     Priority: Medium     Last Assessment & Plan:   A/P: Due for HPV vaccine series, but declines this today. He is  "otherwise UTD on vaccines.        No past medical history on file.  No past surgical history on file.  Current Outpatient Medications   Medication Sig Dispense Refill     amphetamine-dextroamphetamine (ADDERALL) 20 MG tablet Take 1 tablet by mouth three times daily as needed for sleepiness. 90 tablet 0     amphetamine-dextroamphetamine (ADDERALL) 20 MG tablet Take 1 tablet by mouth three times daily as needed for sleepiness. 90 tablet 0     amphetamine-dextroamphetamine (ADDERALL) 20 MG tablet Take 1 tablet by mouth three times daily as needed for sleepiness. 90 tablet 0     OTC products: None, except as noted above    Allergies   Allergen Reactions     Ceftriaxone Other (See Comments)     Anaphylactic shock      Latex Allergy: NO    Social History     Tobacco Use     Smoking status: Current Some Day Smoker     Types: Other     Smokeless tobacco: Never Used     Tobacco comment: E-CIG    Substance Use Topics     Alcohol use: Yes     Comment: occasional use  no requalr usse per wife      History   Drug Use Not on file     Comment: uses an E cigerette       REVIEW OF SYSTEMS:   Constitutional, neuro, ENT, endocrine, pulmonary, cardiac, gastrointestinal, genitourinary, musculoskeletal, integument and psychiatric systems are negative, except as otherwise noted.    EXAM:   /70 (BP Location: Right arm, Patient Position: Chair, Cuff Size: Adult Regular)   Pulse 75   Temp 98  F (36.7  C) (Oral)   Resp 16   Ht 1.778 m (5' 10\")   Wt 72.6 kg (160 lb 1.6 oz)   SpO2 100%   BMI 22.97 kg/m      GENERAL APPEARANCE: healthy, alert and no distress     EYES: EOMI,  PERRL     HENT: Bilateral opacification of both tympanic membranes..nose and mouth without ulcers or lesions     NECK: no adenopathy, no asymmetry, masses, or scars and thyroid normal to palpation     RESP: lungs clear to auscultation - no rales, rhonchi or wheezes     CV: regular rates and rhythm, normal S1 S2, no S3 or S4 and no murmur, click or rub     " ABDOMEN:  soft, nontender, no HSM or masses and bowel sounds normal     MS: extremities normal- no gross deformities noted, no evidence of inflammation in joints, FROM in all extremities.     SKIN: no suspicious lesions or rashes     NEURO: Normal strength and tone, sensory exam grossly normal, mentation intact and speech normal     PSYCH: mentation appears normal. and affect normal/bright     LYMPHATICS: No cervical adenopathy    DIAGNOSTICS:   EKG: Not indicated due to non-vascular surgery and low risk of event (age <65 and without cardiac risk factors)    Recent Labs   Lab Test 06/19/19 08/08/17  0937 11/12/16  0003   HGB  --  16.2 14.7   PLT  --  267 268   NA  --  141 142   POTASSIUM 3.7 4.0 3.8   CR 1.14 0.76 0.94     IMPRESSION:   Reason for surgery/procedure: Left ear hearing loss.  Diagnosis/reason for consult: Preoperative evaluation    The proposed surgical procedure is considered INTERMEDIATE risk.    REVISED CARDIAC RISK INDEX  The patient has the following serious cardiovascular risks for perioperative complications such as (MI, PE, VFib and 3  AV Block):  No serious cardiac risks  INTERPRETATION: 0 risks: Class I (very low risk - 0.4% complication rate)    The patient has the following additional risks for perioperative complications:  No identified additional risks      ICD-10-CM    1. Preop general physical exam Z01.818    2. Mixed conductive and sensorineural hearing loss of left ear, unspecified hearing status on contralateral side H90.72    3. Attention deficit hyperactivity disorder (ADHD), unspecified ADHD type F90.9        RECOMMENDATIONS:     --Patient is to take all scheduled medications on the day of surgery EXCEPT for modifications listed below.  Do not take Adderall on morning of surgery as patient should be n.p.o. and is not a critically necessary medication and may contribute to abnormal blood pressures taken prior to surgery. Okay to continue after surgery.    APPROVAL GIVEN to proceed  with proposed procedure, without further diagnostic evaluation       Signed Electronically by: Amrik Mitchell MD    Copy of this evaluation report is provided to requesting physician.    [facility] Preop Guidelines    Revised Cardiac Risk Index

## 2020-01-20 ENCOUNTER — OFFICE VISIT (OUTPATIENT)
Dept: SLEEP MEDICINE | Facility: CLINIC | Age: 30
End: 2020-01-20
Payer: COMMERCIAL

## 2020-01-20 DIAGNOSIS — G47.11 IDIOPATHIC HYPERSOMNIA WITH LONG SLEEP TIME: ICD-10-CM

## 2020-01-20 PROCEDURE — 99214 OFFICE O/P EST MOD 30 MIN: CPT | Performed by: FAMILY MEDICINE

## 2020-01-20 RX ORDER — DEXTROAMPHETAMINE SACCHARATE, AMPHETAMINE ASPARTATE, DEXTROAMPHETAMINE SULFATE AND AMPHETAMINE SULFATE 5; 5; 5; 5 MG/1; MG/1; MG/1; MG/1
TABLET ORAL
Qty: 90 TABLET | Refills: 0 | Status: SHIPPED | OUTPATIENT
Start: 2020-02-20 | End: 2020-05-14

## 2020-01-20 RX ORDER — BUPROPION HYDROCHLORIDE 150 MG/1
150 TABLET ORAL EVERY MORNING
COMMUNITY
End: 2020-10-23

## 2020-01-20 RX ORDER — MODAFINIL 200 MG/1
200 TABLET ORAL DAILY
Qty: 30 TABLET | Refills: 5 | Status: SHIPPED | OUTPATIENT
Start: 2020-01-20 | End: 2020-05-14

## 2020-01-20 RX ORDER — DEXTROAMPHETAMINE SACCHARATE, AMPHETAMINE ASPARTATE, DEXTROAMPHETAMINE SULFATE AND AMPHETAMINE SULFATE 5; 5; 5; 5 MG/1; MG/1; MG/1; MG/1
TABLET ORAL
Qty: 90 TABLET | Refills: 0 | Status: SHIPPED | OUTPATIENT
Start: 2020-01-20 | End: 2020-05-14

## 2020-01-20 RX ORDER — DEXTROAMPHETAMINE SACCHARATE, AMPHETAMINE ASPARTATE, DEXTROAMPHETAMINE SULFATE AND AMPHETAMINE SULFATE 5; 5; 5; 5 MG/1; MG/1; MG/1; MG/1
TABLET ORAL
Qty: 90 TABLET | Refills: 0 | Status: SHIPPED | OUTPATIENT
Start: 2020-03-20 | End: 2020-05-14

## 2020-01-20 NOTE — PATIENT INSTRUCTIONS

## 2020-01-20 NOTE — PROGRESS NOTES
"  Sleep Follow-Up Visit:    Date on this visit: 1/20/2020    Todd Delgado comes in today for follow-up of chronic daytime fatigue vs idiopathic insomnia with long sleep time.     9/7/2017 - Visit to review actigraphy / PSG / MSLT.  Actigraphy suggestive of long sleep need, TST ranging from 8-9 hours during week and often 12+ hours on weekends with overall average total sleep time of 570 minutes (~9.5 hours).  PSG with no significant sleep disordered breathing.  MSLT with mean sleep latency of 14.3 minutes on 4 naps (10.9, 12.8, 13.5, 20).  Given significant social impairment, consistent history, we did agree to trial of wakefulness promoting medication with modafinil 100-200mg PO QD to BID.     6/15/2018 - Returns for follow-up.  Since last visit, multiple telephone encounters. Modafinil not approved, so trial of methylphenidate.  This was felt to be minimal beneficial and noted weight loss (? ~20 lbs).  We did transition to adderall 20mg PO BID to TID with some benefit, weight stable and slowly returning to baseline.  He describes as his response to medication as being still \"tired\" but feeling his brain is \"more snappy and alert\".  Has had continued excellent performance at work.  Works in , report of potential promotion but mentioned that his sleep disorder may be a \"liability\".  Denies any inappropriate sleepiness with driving.  Noted to have multiple negative urine tox screens, he denies any THC or other chemical use.  A/P to continue recommend adequate sleep time, adderall 20mg PO BID to TID PRN and repeat attempt for coverage of modafinil.    Today - Returns for annual follow-up.  Overall, he feels he is doing \"about the same\" or perhaps a bit worse.  He will be starting orientation for a position with the [employer] and is living in [facility].  He did meet with neurology in regards to C5 herniation, question of depression.  Did meet with psychiatry, started on wellbutrin XL.  Weight stable.  He is " somewhat vague on his exact sleep pattern, but reports getting 8+ hours of sleep.  Negative narcolepsy triad.  He is also scheduled for upcoming surgery with ENT to replace inner ear bones.     Prior Sleep Testing:  Actigraphy performed 8/7/2017 - 8/23/2017, sleep diaries were completed.  Average sleep time during weekdays of ~8-9 hours, but seen to be often 12+ hours on weekends.  No significant daytime napping noted.  Overall, low daytime activity seen on weekends.  Overall, average total sleep time of 570 minutes.     PSG performed 8/23/2017 with AHI 1.3, martir SpO2 92.7%, baseline SpO2 96.5%.  PLM index of 1/hour.  Total sleep time of 462.5 minutes.     MSLT performed 8/24/2017, 4 naps (10.9, 12.8, 13.5, 20) with mean sleep latency of 14.3 minutes and no sleep onset REM's.  Urine toxicology was negative.    Problem List:  Patient Active Problem List    Diagnosis Date Noted     Idiopathic hypersomnia with long sleep time 09/08/2017     Priority: Medium     Conductive hearing loss in right ear 03/23/2017     Priority: Medium     Mixed hearing loss of left ear 03/23/2017     Priority: Medium     Chronic low back pain 06/28/2016     Priority: Medium     Last Assessment & Plan:   A: Patient with history of acute on chronic low back pain that has been worse since Monday after injury at work. Currently taking Medrol Dosepak and tramadol for pain management prescribed by the ED. Notes ongoing pain today. No red flags or acutely concerning symptoms at this time. Physical exam shows lower lumbar tenderness to palpation. Discussed that pain is likely related to inflammation of a spine disk and would anticipate improvement with finishing medrol dosepak and taking NSAIDs. Patient expressed an understanding of this.    P:  - Continue Medrol Dosepak and Tramadol as prescribed  - Refilled Tramadol #20 QID PRN, advised patient to not to drive while taking this medication  - Recommended ibuprofen 600 mg QID PRN   - Work note  provided to patient   - Advised patient to avoid heavy lifting  - Follow-up in 1 month, sooner if pain persists/worsens and can consider imaging at that time       Health care maintenance 06/28/2016     Priority: Medium     Last Assessment & Plan:   A/P: Due for HPV vaccine series, but declines this today. He is otherwise UTD on vaccines.          Impression/Plan:    1.)  Chronic fatigue versus idiopathic hypersomnia with long sleep time  2.)  Co-morbid depression  - Plan to continue adderall 20mg PO BID to TID PRN and repeat attempt to get approval for modafinil 100-200mg PO every day.    He will follow up with me in about 1 year(s).     Twenty-five minutes spent with patient, all of which were spent face-to-face counseling, consulting, coordinating plan of care.      Atul Abraham MD    CC: Clinic, [facility]

## 2020-01-23 ENCOUNTER — TRANSFERRED RECORDS (OUTPATIENT)
Dept: HEALTH INFORMATION MANAGEMENT | Facility: CLINIC | Age: 30
End: 2020-01-23

## 2020-02-28 ENCOUNTER — NURSE TRIAGE (OUTPATIENT)
Dept: NURSING | Facility: CLINIC | Age: 30
End: 2020-02-28

## 2020-02-28 ENCOUNTER — TELEPHONE (OUTPATIENT)
Dept: SCHEDULING | Facility: CLINIC | Age: 30
End: 2020-02-28

## 2020-02-28 DIAGNOSIS — G47.11 IDIOPATHIC HYPERSOMNIA WITH LONG SLEEP TIME: ICD-10-CM

## 2020-02-28 RX ORDER — DEXTROAMPHETAMINE SACCHARATE, AMPHETAMINE ASPARTATE, DEXTROAMPHETAMINE SULFATE AND AMPHETAMINE SULFATE 5; 5; 5; 5 MG/1; MG/1; MG/1; MG/1
TABLET ORAL
Qty: 90 TABLET | Refills: 0 | Status: CANCELLED | OUTPATIENT
Start: 2020-02-28

## 2020-02-28 NOTE — TELEPHONE ENCOUNTER
Reason for call:  Other   Patient called regarding (reason for call): prescription  Additional comments: has a question about getting a refill on a medication for sleep    Phone number to reach patient:  Home number on file [phone] (home)    Best Time:  any    Can we leave a detailed message on this number?  YES    Travel screening: Not Applicable

## 2020-02-28 NOTE — TELEPHONE ENCOUNTER
"RN triage call  Patient is requesting a refill of his Adderall 20 mg tablets.Dr. Abraham is the prescriber.  [facility]  Unable to refill per RN protocol.  Routed request to Dr. Leigh dickinson.  Angelina Lazaro, LEVY  [facility] Nurse Advisor          Reason for Disposition    [1] Request for URGENT new prescription or refill of \"essential\" medication (i.e., likelihood of harm to patient if not taken) AND [2] triager unable to fill per unit policy    Protocols used: MEDICATION QUESTION CALL-A-AH      "

## 2020-03-02 NOTE — TELEPHONE ENCOUNTER
Return call made to pt.  Pt was not aware that rx was being called in monthly.  He has not picked up his 2/20/20 rx.  He was informed that both Feb and Marches prescriptions have been sent to pharmacy.    LUCAS Boone

## 2020-09-16 DIAGNOSIS — G47.11 IDIOPATHIC HYPERSOMNIA WITH LONG SLEEP TIME: ICD-10-CM

## 2020-09-16 NOTE — TELEPHONE ENCOUNTER
Chief Complaint   Patient presents with     Refill Request      Refill request received via phone by patient or caregiver      Patient's wife called as patient was at work. Patient is out of medication and needing the refill as soon as possible.    No prescriptions requested or ordered in this encounter         Last Written Prescription Date:  05/15/20  Last Fill Quantity: 30,   # refills: 3  Last Office Visit with prescribing provider: 01/20/20  Future Office visit: None (per note follow up in one year)      Routing refill request to provider for review/approval because:  Drug not on the [facility], P or  Health refill protocol or controlled substance    Marleni Platt [facility]

## 2020-09-17 RX ORDER — DEXTROAMPHETAMINE SACCHARATE, AMPHETAMINE ASPARTATE, DEXTROAMPHETAMINE SULFATE AND AMPHETAMINE SULFATE 5; 5; 5; 5 MG/1; MG/1; MG/1; MG/1
TABLET ORAL
Qty: 90 TABLET | Refills: 0 | Status: SHIPPED | OUTPATIENT
Start: 2020-10-17 | End: 2020-10-23

## 2020-09-17 RX ORDER — DEXTROAMPHETAMINE SACCHARATE, AMPHETAMINE ASPARTATE, DEXTROAMPHETAMINE SULFATE AND AMPHETAMINE SULFATE 5; 5; 5; 5 MG/1; MG/1; MG/1; MG/1
TABLET ORAL
Qty: 90 TABLET | Refills: 0 | Status: SHIPPED | OUTPATIENT
Start: 2020-09-17 | End: 2020-10-23

## 2020-09-17 RX ORDER — DEXTROAMPHETAMINE SACCHARATE, AMPHETAMINE ASPARTATE, DEXTROAMPHETAMINE SULFATE AND AMPHETAMINE SULFATE 5; 5; 5; 5 MG/1; MG/1; MG/1; MG/1
TABLET ORAL
Qty: 90 TABLET | Refills: 0 | Status: SHIPPED | OUTPATIENT
Start: 2020-11-17 | End: 2020-10-23

## 2020-10-23 ENCOUNTER — VIRTUAL VISIT (OUTPATIENT)
Dept: SLEEP MEDICINE | Facility: CLINIC | Age: 30
End: 2020-10-23
Payer: COMMERCIAL

## 2020-10-23 VITALS — HEIGHT: 70 IN | WEIGHT: 160 LBS | BODY MASS INDEX: 22.9 KG/M2

## 2020-10-23 DIAGNOSIS — G47.11 IDIOPATHIC HYPERSOMNIA WITH LONG SLEEP TIME: ICD-10-CM

## 2020-10-23 PROCEDURE — 99214 OFFICE O/P EST MOD 30 MIN: CPT | Mod: 95 | Performed by: FAMILY MEDICINE

## 2020-10-23 RX ORDER — MODAFINIL 200 MG/1
200 TABLET ORAL 2 TIMES DAILY
Qty: 60 TABLET | Refills: 5 | Status: SHIPPED | OUTPATIENT
Start: 2020-10-23 | End: 2021-04-20

## 2020-10-23 RX ORDER — FLUOXETINE 40 MG/1
40 CAPSULE ORAL DAILY
COMMUNITY
End: 2021-10-14

## 2020-10-23 ASSESSMENT — MIFFLIN-ST. JEOR: SCORE: 1697.01

## 2020-10-23 NOTE — PROGRESS NOTES
"Todd Delgado is a 30 year old male who is being evaluated via a billable telephone visit.      The patient has been notified of following:     \"This telephone visit will be conducted via a call between you and your physician/provider. We have found that certain health care needs can be provided without the need for a physical exam.  This service lets us provide the care you need with a short phone conversation.  If a prescription is necessary we can send it directly to your pharmacy.  If lab work is needed we can place an order for that and you can then stop by our lab to have the test done at a later time.    Telephone visits are billed at different rates depending on your insurance coverage. During this emergency period, for some insurers they may be billed the same as an in-person visit.  Please reach out to your insurance provider with any questions.    If during the course of the call the physician/provider feels a telephone visit is not appropriate, you will not be charged for this service.\"    Patient has given verbal consent for Telephone visit?  Yes    What phone number would you like to be contacted at? [phone]    How would you like to obtain your AVS? MyChart    Virtual visit for history of excessive daytime sleepiness with presumed idiopathic hypersomnia with long sleep time.    Impression/Plan:     1.)  Chronic fatigue versus idiopathic hypersomnia with long sleep time  2.)  Co-morbid major depression  - Will discontinue adderall given report of minimal benefit.  - Given feeling of definite benefit from Modafinil, will plan to increase from 200mg every day to 200mg BID.    31 yo M with PMHx of depression, idiopathic hypersomnia with long sleep time.    9/7/2017 - Visit to review actigraphy / PSG / MSLT.  Actigraphy suggestive of long sleep need, TST ranging from 8-9 hours during week and often 12+ hours on weekends with overall average total sleep time of 570 minutes (~9.5 hours).  PSG with no " "significant sleep disordered breathing.  MSLT with mean sleep latency of 14.3 minutes on 4 naps (10.9, 12.8, 13.5, 20).  Given significant social impairment, consistent history, we did agree to trial of wakefulness promoting medication with modafinil 100-200mg PO QD to BID.     6/15/2018 - Returns for follow-up.  Since last visit, multiple telephone encounters. Modafinil not approved, so trial of methylphenidate.  This was felt to be minimal beneficial and noted weight loss (? ~20 lbs).  We did transition to adderall 20mg PO BID to TID with some benefit, weight stable and slowly returning to baseline.  He describes as his response to medication as being still \"tired\" but feeling his brain is \"more snappy and alert\".  Has had continued excellent performance at work.  Works in , report of potential promotion but mentioned that his sleep disorder may be a \"liability\".  Denies any inappropriate sleepiness with driving.  Noted to have multiple negative urine tox screens, he denies any THC or other chemical use.  A/P to continue recommend adequate sleep time, adderall 20mg PO BID to TID PRN and repeat attempt for coverage of modafinil.     1/20/2020 - Returns for annual follow-up.  Overall, he feels he is doing \"about the same\" or perhaps a bit worse.  He will be starting orientation for a position with the [employer] and is living in [facility].  He did meet with neurology in regards to C5 herniation, question of depression.  Did meet with psychiatry, started on wellbutrin XL.  Weight stable.  He is somewhat vague on his exact sleep pattern, but reports getting 8+ hours of sleep.  Negative narcolepsy triad.  He is also scheduled for upcoming surgery with ENT to replace inner ear bones.  A/P to attempt to get coverage for modafinil 100-200mg PO QAM and continue adderall IR 20mg BID to TID PRN.    Today - He presents for a virtual phone follow-up visit.  He has continued to follow with psychiatry.  He has " recently discontinued wellbutrin XL due to report of suicidal thinking, transitioned to fluoxetine 40mg every day. Did have resolution of suicidal ideation with stop of wellbutrin.    He feels his sleepiness is still an issue and last week fell asleep at a stop (works as [employer]) for ~2 hours and was awoken by police.  This occurred ~6pm.    In bed most nights between 8-10pm and usually falls asleep quickly.  Seems to deny frequent or prolonged awakenings.  He is vague on his wake up time.  Estimates total sleep time 8-10 hours.    He feels the Adderall has not had significant benefit, but does feel the modafinil continues to be beneficial.  Currently taking modafinil 200mg PO QAM, adderall IR 20mg up to TID (often upon awakening, ~9am, ~1pm).     Prior Sleep Testing:  Actigraphy performed 8/7/2017 - 8/23/2017, sleep diaries were completed.  Average sleep time during weekdays of ~8-9 hours, but seen to be often 12+ hours on weekends.  No significant daytime napping noted.  Overall, low daytime activity seen on weekends.  Overall, average total sleep time of 570 minutes.     PSG performed 8/23/2017 with AHI 1.3, martir SpO2 92.7%, baseline SpO2 96.5%.  PLM index of 1/hour.  Total sleep time of 462.5 minutes.     MSLT performed 8/24/2017, 4 naps (10.9, 12.8, 13.5, 20) with mean sleep latency of 14.3 minutes and no sleep onset REM's.  Urine toxicology was negative.    Phone call duration: 25 minutes    Atul Abraham MD, MD

## 2020-11-16 ENCOUNTER — HEALTH MAINTENANCE LETTER (OUTPATIENT)
Age: 30
End: 2020-11-16

## 2021-04-20 DIAGNOSIS — G47.11 IDIOPATHIC HYPERSOMNIA WITH LONG SLEEP TIME: ICD-10-CM

## 2021-04-20 NOTE — TELEPHONE ENCOUNTER
Pended rx Modafinil 200 mg tabs, DISP:  60 tabs, r^5, SIG:  Take 1 tablet by mouth twice daily.       Last Written Prescription Date: 10/23/2020  Last Fill Quantity: 60,   # refills: 5  Last Office Visit: 10/23/2020  Future Office visit:     Routed Dr. Abraham for review.      Routing refill request to provider for review/approval because:  Drug not on the [facility], [facility] or [facility] refill protocol or controlled substance

## 2021-04-21 RX ORDER — MODAFINIL 200 MG/1
200 TABLET ORAL 2 TIMES DAILY
Qty: 60 TABLET | Refills: 5 | Status: SHIPPED | OUTPATIENT
Start: 2021-04-21 | End: 2021-10-14

## 2021-09-18 ENCOUNTER — HEALTH MAINTENANCE LETTER (OUTPATIENT)
Age: 31
End: 2021-09-18

## 2021-09-30 DIAGNOSIS — G47.11 IDIOPATHIC HYPERSOMNIA WITH LONG SLEEP TIME: ICD-10-CM

## 2021-09-30 NOTE — TELEPHONE ENCOUNTER
amphetamine-dextroamphetamine (ADDERALL) 20 MG tablet   Last Written Prescription Date:  5/15/2020  Last Fill Quantity: 90,   # refills: 0  Last Office Visit: 10/23/2020  Future Office visit:   10/14/2021    Routing refill request to provider for review/approval because:  Drug not on the [facility], P or [facility] refill protocol or controlled substance

## 2021-09-30 NOTE — TELEPHONE ENCOUNTER
Reason for call:  Medication   If this is a refill request, has the caller requested the refill from the pharmacy already? No  Will the patient be using a [facility]? No  Name of the pharmacy and phone number for the current request[facility] [phone]    Name of the medication requested: modafinil    Other request: Patient scheduled the first available appointment and will be out of his prescription before the appt. He is requesting a temporary refill if possible    Phone number to reach patient:  Cell number on file:    Telephone Information:   Mobile [phone]       Best Time:  any    Can we leave a detailed message on this number?  YES    Travel screening: Negative

## 2021-10-01 RX ORDER — DEXTROAMPHETAMINE SACCHARATE, AMPHETAMINE ASPARTATE, DEXTROAMPHETAMINE SULFATE AND AMPHETAMINE SULFATE 5; 5; 5; 5 MG/1; MG/1; MG/1; MG/1
TABLET ORAL
Qty: 90 TABLET | Refills: 0 | Status: SHIPPED | OUTPATIENT
Start: 2021-10-01 | End: 2021-10-14

## 2021-10-13 NOTE — PROGRESS NOTES
"Todd Delgado is a 31 year old male who is being evaluated via a billable video visit.       The patient has been notified of following:      \"This video visit will be conducted via a call between you and your physician/provider. We have found that certain health care needs can be provided without the need for an in-person physical exam.  This service lets us provide the care you need with a video conversation.  If a prescription is necessary we can send it directly to your pharmacy.  If lab work is needed we can place an order for that and you can then stop by our lab to have the test done at a later time.     Video visits are billed at different rates depending on your insurance coverage.  Please reach out to your insurance provider with any questions.     If during the course of the call the physician/provider feels a video visit is not appropriate, you will not be charged for this service.\"     Patient has given verbal consent for Video visit? Yes  How would you like to obtain your AVS? Mail a copy  If you are dropped from the video visit, the video invite should be resent to: Text to cell phone: [phone]  Will anyone else be joining your video visit? No  If patient encounters technical issues they should call [phone]      Video-Visit Details     Type of service:  Video Visit     Video Start Time: 12:17pm  Video End Time: 12:31pm    Originating Location (pt. Location): Home     Distant Location (provider location):  [facility]      Platform used for Video Visit: #waywire    Virtual visit for hypesomnia.     Impression/Plan:     1.)  Chronic fatigue versus idiopathic hypersomnia with long sleep time  2.)  Co-morbid major depression  - Given feeling of definite benefit from Modafinil, will continue 200mg BID.  - Also ok'd adderall IR 20mg every day PRN.  - Refills sent today.  - Follow up in 1 year.    SUBJECTIVE:  Todd Delgado is a 31 year old year old male.    PMHx of " "depression, idiopathic hypersomnia with long sleep time.    9/7/2017 - Visit to review actigraphy / PSG / MSLT.  Actigraphy suggestive of long sleep need, TST ranging from 8-9 hours during week and often 12+ hours on weekends with overall average total sleep time of 570 minutes (~9.5 hours).  PSG with no significant sleep disordered breathing.  MSLT with mean sleep latency of 14.3 minutes on 4 naps (10.9, 12.8, 13.5, 20).  Given significant social impairment, consistent history, we did agree to trial of wakefulness promoting medication with modafinil 100-200mg PO QD to BID.     6/15/2018 - Returns for follow-up.  Since last visit, multiple telephone encounters. Modafinil not approved, so trial of methylphenidate.  This was felt to be minimal beneficial and noted weight loss (? ~20 lbs).  We did transition to adderall 20mg PO BID to TID with some benefit, weight stable and slowly returning to baseline.  He describes as his response to medication as being still \"tired\" but feeling his brain is \"more snappy and alert\".  Has had continued excellent performance at work.  Works in , report of potential promotion but mentioned that his sleep disorder may be a \"liability\".  Denies any inappropriate sleepiness with driving.  Noted to have multiple negative urine tox screens, he denies any THC or other chemical use.  A/P to continue recommend adequate sleep time, adderall 20mg PO BID to TID PRN and repeat attempt for coverage of modafinil.     1/20/2020 - Returns for annual follow-up.  Overall, he feels he is doing \"about the same\" or perhaps a bit worse.  He will be starting orientation for a position with the [employer] and is living in [facility].  He did meet with neurology in regards to C5 herniation, question of depression.  Did meet with psychiatry, started on wellbutrin XL.  Weight stable.  He is somewhat vague on his exact sleep pattern, but reports getting 8+ hours of sleep.  Negative narcolepsy triad.  He " is also scheduled for upcoming surgery with ENT to replace inner ear bones.  A/P to attempt to get coverage for modafinil 100-200mg PO QAM and continue adderall IR 20mg BID to TID PRN.     10/23/2020 - He presents for a virtual phone follow-up visit.  He has continued to follow with psychiatry.  He has recently discontinued wellbutrin XL due to report of suicidal thinking, transitioned to fluoxetine 40mg every day. Did have resolution of suicidal ideation with stop of wellbutrin.     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  This occurred ~6pm.     In bed most nights between 8-10pm and usually falls asleep quickly.  Seems to deny frequent or prolonged awakenings.  He is vague on his wake up time.  Estimates total sleep time 8-10 hours.     He feels the Adderall has not had significant benefit, but does feel the modafinil continues to be beneficial.  Currently taking modafinil 200mg PO QAM, adderall IR 20mg up to TID (often upon awakening, ~9am, ~1pm).    A/P to stop adderall, continue modafinil and increase to 200mg BID.    Today - Overall, he feels his sleepiness is stable with the modafinil 200mg BID.  He feels it would be helpful to have a small amount of adderall to take as needed.  He is working on evaluation for either ADHD or possible autism spectrum disorder.     Prior Sleep Testing:  Actigraphy performed 8/7/2017 - 8/23/2017, sleep diaries were completed.  Average sleep time during weekdays of ~8-9 hours, but seen to be often 12+ hours on weekends.  No significant daytime napping noted.  Overall, low daytime activity seen on weekends.  Overall, average total sleep time of 570 minutes.     PSG performed 8/23/2017 with AHI 1.3, martir SpO2 92.7%, baseline SpO2 96.5%.  PLM index of 1/hour.  Total sleep time of 462.5 minutes.     MSLT performed 8/24/2017, 4 naps (10.9, 12.8, 13.5, 20) with mean sleep latency of  14.3 minutes and no sleep onset REM's.  Urine toxicology was negative.       Past medical history:    Patient Active Problem List    Diagnosis Date Noted     Idiopathic hypersomnia with long sleep time 09/08/2017     Priority: Medium     Conductive hearing loss in right ear 03/23/2017     Priority: Medium     Mixed hearing loss of left ear 03/23/2017     Priority: Medium     Chronic low back pain 06/28/2016     Priority: Medium     Last Assessment & Plan:   A: Patient with history of acute on chronic low back pain that has been worse since Monday after injury at work. Currently taking Medrol Dosepak and tramadol for pain management prescribed by the ED. Notes ongoing pain today. No red flags or acutely concerning symptoms at this time. Physical exam shows lower lumbar tenderness to palpation. Discussed that pain is likely related to inflammation of a spine disk and would anticipate improvement with finishing medrol dosepak and taking NSAIDs. Patient expressed an understanding of this.    P:  - Continue Medrol Dosepak and Tramadol as prescribed  - Refilled Tramadol #20 QID PRN, advised patient to not to drive while taking this medication  - Recommended ibuprofen 600 mg QID PRN   - Work note provided to patient   - Advised patient to avoid heavy lifting  - Follow-up in 1 month, sooner if pain persists/worsens and can consider imaging at that time       Health care maintenance 06/28/2016     Priority: Medium     Last Assessment & Plan:   A/P: Due for HPV vaccine series, but declines this today. He is otherwise UTD on vaccines.         10 point ROS of systems including Constitutional, Eyes, Respiratory, Cardiovascular, Gastroenterology, Genitourinary, Integumentary, Muscularskeletal, Psychiatric were all negative except for pertinent positives noted in my HPI.    Current Outpatient Medications   Medication Sig Dispense Refill     amphetamine-dextroamphetamine (ADDERALL) 20 MG tablet Take 1 tablet by mouth three times  daily as needed for sleepiness. 90 tablet 0     FLUoxetine (PROZAC) 40 MG capsule Take 40 mg by mouth daily       modafinil (PROVIGIL) 200 MG tablet Take 1 tablet (200 mg) by mouth 2 times daily 60 tablet 5       OBJECTIVE:  There were no vitals taken for this visit.    Physical Exam     ---  This note was written with the assistance of the Dragon voice-dictation technology software. The final document, although reviewed, may contain errors. For corrections, please contact the office.    Atul Abraham MD    Sleep Medicine  [facility]  ([phone])  [facility]  ([phone])

## 2021-10-14 ENCOUNTER — VIRTUAL VISIT (OUTPATIENT)
Dept: SLEEP MEDICINE | Facility: CLINIC | Age: 31
End: 2021-10-14
Payer: COMMERCIAL

## 2021-10-14 VITALS — HEIGHT: 70 IN | BODY MASS INDEX: 25.05 KG/M2 | WEIGHT: 175 LBS

## 2021-10-14 DIAGNOSIS — G47.11 IDIOPATHIC HYPERSOMNIA WITH LONG SLEEP TIME: ICD-10-CM

## 2021-10-14 PROCEDURE — 99214 OFFICE O/P EST MOD 30 MIN: CPT | Mod: 95 | Performed by: FAMILY MEDICINE

## 2021-10-14 RX ORDER — DEXTROAMPHETAMINE SACCHARATE, AMPHETAMINE ASPARTATE, DEXTROAMPHETAMINE SULFATE AND AMPHETAMINE SULFATE 5; 5; 5; 5 MG/1; MG/1; MG/1; MG/1
TABLET ORAL
Qty: 30 TABLET | Refills: 0 | Status: SHIPPED | OUTPATIENT
Start: 2021-10-14 | End: 2022-09-03

## 2021-10-14 RX ORDER — MODAFINIL 200 MG/1
200 TABLET ORAL 2 TIMES DAILY
Qty: 60 TABLET | Refills: 5 | Status: SHIPPED | OUTPATIENT
Start: 2021-10-14 | End: 2022-03-30

## 2021-10-14 ASSESSMENT — MIFFLIN-ST. JEOR: SCORE: 1760.04

## 2021-10-14 NOTE — PROGRESS NOTES
"Todd is a 31 year old who is being evaluated via a billable video visit.      How would you like to obtain your AVS? MyChart  If the video visit is dropped, the invitation should be resent by: Text to cell phone: [phone]  Will anyone else be joining your video visit? No  {If patient encounters technical issues they should call [phone] :[id number]}    Video Start Time: {video visit start/end time for provider to select:152948}  Video-Visit Details    Type of service:  Video Visit    Video End Time:{video visit start/end time for provider to select:152948}    Originating Location (pt. Location): {video visit patient location:858113::\"Home\"}    Distant Location (provider location):  [facility]     Platform used for Video Visit: {Virtual Visit Platforms:281646::\"Best Five Reviewed\"}    "

## 2021-10-14 NOTE — PATIENT INSTRUCTIONS
Your BMI is Body mass index is 25.11 kg/m .  Weight management is a personal decision.  If you are interested in exploring weight loss strategies, the following discussion covers the approaches that may be successful. Body mass index (BMI) is one way to tell whether you are at a healthy weight, overweight, or obese. It measures your weight in relation to your height.  A BMI of 18.5 to 24.9 is in the healthy range. A person with a BMI of 25 to 29.9 is considered overweight, and someone with a BMI of 30 or greater is considered obese. More than two-thirds of American adults are considered overweight or obese.  Being overweight or obese increases the risk for further weight gain. Excess weight may lead to heart disease and diabetes.  Creating and following plans for healthy eating and physical activity may help you improve your health.  Weight control is part of healthy lifestyle and includes exercise, emotional health, and healthy eating habits. Careful eating habits lifelong are the mainstay of weight control. Though there are significant health benefits from weight loss, long-term weight loss with diet alone may be very difficult to achieve- studies show long-term success with dietary management in less than 10% of people. Attaining a healthy weight may be especially difficult to achieve in those with severe obesity. In some cases, medications, devices and surgical management might be considered.  What can you do?  If you are overweight or obese and are interested in methods for weight loss, you should discuss this with your provider.     Consider reducing daily calorie intake by 500 calories.     Keep a food journal.     Avoiding skipping meals, consider cutting portions instead.    Diet combined with exercise helps maintain muscle while optimizing fat loss. Strength training is particularly important for building and maintaining muscle mass. Exercise helps reduce stress, increase energy, and improves fitness.  Increasing exercise without diet control, however, may not burn enough calories to loose weight.       Start walking three days a week 10-20 minutes at a time    Work towards walking thirty minutes five days a week     Eventually, increase the speed of your walking for 1-2 minutes at time    In addition, we recommend that you review healthy lifestyles and methods for weight loss available through the National Institutes of Health patient information sites:  http://win.niddk.nih.gov/publications/index.htm    And look into health and wellness programs that may be available through your health insurance provider, employer, local community center, or chayito club.

## 2021-11-03 NOTE — NURSING NOTE
1 year appointment reminder card created and will be mailed when appropriate. Armand Hinds, [facility]

## 2022-01-08 ENCOUNTER — HEALTH MAINTENANCE LETTER (OUTPATIENT)
Age: 32
End: 2022-01-08

## 2022-02-24 ENCOUNTER — VIRTUAL VISIT (OUTPATIENT)
Dept: SLEEP MEDICINE | Facility: CLINIC | Age: 32
End: 2022-02-24
Payer: COMMERCIAL

## 2022-02-24 VITALS — WEIGHT: 168 LBS | HEIGHT: 70 IN | BODY MASS INDEX: 24.05 KG/M2

## 2022-02-24 DIAGNOSIS — G47.11 IDIOPATHIC HYPERSOMNIA WITH LONG SLEEP TIME: Primary | ICD-10-CM

## 2022-02-24 PROCEDURE — 99214 OFFICE O/P EST MOD 30 MIN: CPT | Mod: 95 | Performed by: FAMILY MEDICINE

## 2022-02-24 ASSESSMENT — SLEEP AND FATIGUE QUESTIONNAIRES
HOW LIKELY ARE YOU TO NOD OFF OR FALL ASLEEP WHILE LYING DOWN TO REST IN THE AFTERNOON WHEN CIRCUMSTANCES PERMIT: HIGH CHANCE OF DOZING
HOW LIKELY ARE YOU TO NOD OFF OR FALL ASLEEP WHEN YOU ARE A PASSENGER IN A CAR FOR AN HOUR WITHOUT A BREAK: HIGH CHANCE OF DOZING
HOW LIKELY ARE YOU TO NOD OFF OR FALL ASLEEP WHILE SITTING QUIETLY AFTER LUNCH WITHOUT ALCOHOL: HIGH CHANCE OF DOZING
HOW LIKELY ARE YOU TO NOD OFF OR FALL ASLEEP WHILE SITTING AND TALKING TO SOMEONE: MODERATE CHANCE OF DOZING
HOW LIKELY ARE YOU TO NOD OFF OR FALL ASLEEP WHILE WATCHING TV: HIGH CHANCE OF DOZING
HOW LIKELY ARE YOU TO NOD OFF OR FALL ASLEEP WHILE SITTING AND READING: HIGH CHANCE OF DOZING
HOW LIKELY ARE YOU TO NOD OFF OR FALL ASLEEP WHILE SITTING INACTIVE IN A PUBLIC PLACE: HIGH CHANCE OF DOZING
HOW LIKELY ARE YOU TO NOD OFF OR FALL ASLEEP IN A CAR, WHILE STOPPED FOR A FEW MINUTES IN TRAFFIC: MODERATE CHANCE OF DOZING

## 2022-02-24 ASSESSMENT — PATIENT HEALTH QUESTIONNAIRE - PHQ9
SUM OF ALL RESPONSES TO PHQ QUESTIONS 1-9: 16
SUM OF ALL RESPONSES TO PHQ QUESTIONS 1-9: 16
10. IF YOU CHECKED OFF ANY PROBLEMS, HOW DIFFICULT HAVE THESE PROBLEMS MADE IT FOR YOU TO DO YOUR WORK, TAKE CARE OF THINGS AT HOME, OR GET ALONG WITH OTHER PEOPLE: EXTREMELY DIFFICULT

## 2022-02-24 ASSESSMENT — PAIN SCALES - GENERAL: PAINLEVEL: NO PAIN (0)

## 2022-02-24 NOTE — PROGRESS NOTES
"Todd is a 31 year old who is being evaluated via a billable video visit.      Pt states he had to take layoff from work due to not being able to stay awake.     How would you like to obtain your AVS? MyChart  If the video visit is dropped, the invitation should be resent by: Text to cell phone: [phone]  Will anyone else be joining your video visit? Yes: Wife, Mena. How would they like to receive their invitation? {:[id number]}  {If patient encounters technical issues they should call [phone] :[id number]}     Shelia Harvey    Video Start Time: {video visit start/end time for provider to select:668630}  Video-Visit Details    Type of service:  Video Visit    Video End Time:{video visit start/end time for provider to select:549486}    Originating Location (pt. Location): {video visit patient location:970750::\"Home\"}    Distant Location (provider location):  [facility]     Platform used for Video Visit: {Virtual Visit Platforms:245431::\"Everlasting Footprint\"}  "

## 2022-02-24 NOTE — PROGRESS NOTES
"Todd Delgado is a 31 year old male who is being evaluated via a billable video visit.       The patient has been notified of following:      \"This video visit will be conducted via a call between you and your physician/provider. We have found that certain health care needs can be provided without the need for an in-person physical exam.  This service lets us provide the care you need with a video conversation.  If a prescription is necessary we can send it directly to your pharmacy.  If lab work is needed we can place an order for that and you can then stop by our lab to have the test done at a later time.     Video visits are billed at different rates depending on your insurance coverage.  Please reach out to your insurance provider with any questions.     If during the course of the call the physician/provider feels a video visit is not appropriate, you will not be charged for this service.\"     Patient has given verbal consent for Video visit? Yes  How would you like to obtain your AVS? Mail a copy  If you are dropped from the video visit, the video invite should be resent to: Text to cell phone: -  Will anyone else be joining your video visit? No  If patient encounters technical issues they should call [phone]      Video-Visit Details     Type of service:  Video Visit     Video Start Time: 2:30pm  Video End Time: 3pm    Originating Location (pt. Location): Home     Distant Location (provider location):  [facility]      Platform used for Video Visit: [employer]    Virtual visit for idiopathic hypersomnia.     Assessment / plan:    1.)  Presumed idiopathic hypersomnia with long sleep time  2.)  Irregular sleep-wake pattern, though appears to be getting adequate total sleep and overall long sleep time.  3.)  Comorbid depression    We discussed that it is somewhat challenging to assess his current symptoms or sleepiness given his irregular sleep-wake schedule and lack of constraints " on time.  But, he does appear to be reporting times of inappropriate sleepiness.    He does endorse some improvement with use of modafinil.    We did discuss Xywav and its recent FDA approval for idiopathic hypersomnia.  We discussed the pros and cons, including that it is a controlled substance.  They do have a medication safe at home.  So I did agree to start the process for trial of Xywav, we will plan to continue modafinil 200 mg twice daily, and can consider tapering or discontinuing if improvement after start of Xywav.    If there is no significant improvement with a trial of Xywav, then I would recommend that we repeat actigraphy with PSG and MSLT at that time.      SUBJECTIVE:  Todd Delgado is a 31 year old male.    PMHx of depression, idiopathic hypersomnia with long sleep time.    Prior Sleep Testing:    Actigraphy performed 8/7/2017 - 8/23/2017, sleep diaries were completed.  Average sleep time during weekdays of ~8-9 hours, but seen to be often 12+ hours on weekends.  No significant daytime napping noted.  Overall, low daytime activity seen on weekends.  Overall, average total sleep time of 570 minutes.    PSG performed 8/23/2017 with AHI 1.3, martir SpO2 92.7%, baseline SpO2 96.5%.  The patient spent 5.1% of total sleep time in Stage N1, 53.8% in Stage N2, 18.5% in Stages N3, and 22.6% in REM.  Time in REM supine was 73.5 minutes.  PLM index of 1/hour.  Total sleep time of 462.5 minutes.    MSLT performed 8/24/2017, 4 naps (10.9, 12.8, 13.5, 20) with mean sleep latency of 14.3 minutes and no sleep onset REM's.  Urine toxicology was negative.     9/7/2017 - Visit to review actigraphy / PSG / MSLT.  Actigraphy suggestive of long sleep need, TST ranging from 8-9 hours during week and often 12+ hours on weekends with overall average total sleep time of 570 minutes (~9.5 hours).  PSG with no significant sleep disordered breathing.  MSLT with mean sleep latency of 14.3 minutes on 4 naps (10.9, 12.8, 13.5,  "20).  Given significant social impairment, consistent history, we did agree to trial of wakefulness promoting medication with modafinil 100-200mg PO QD to BID.     6/15/2018 - Returns for follow-up.  Since last visit, multiple telephone encounters. Modafinil not approved, so trial of methylphenidate.  This was felt to be minimal beneficial and noted weight loss (? ~20 lbs).  We did transition to adderall 20mg PO BID to TID with some benefit, weight stable and slowly returning to baseline.  He describes as his response to medication as being still \"tired\" but feeling his brain is \"more snappy and alert\".  Has had continued excellent performance at work.  Works in , report of potential promotion but mentioned that his sleep disorder may be a \"liability\".  Denies any inappropriate sleepiness with driving.  Noted to have multiple negative urine tox screens, he denies any THC or other chemical use.  A/P to continue recommend adequate sleep time, adderall 20mg PO BID to TID PRN and repeat attempt for coverage of modafinil.     1/20/2020 - Returns for annual follow-up.  Overall, he feels he is doing \"about the same\" or perhaps a bit worse.  He will be starting orientation for a position with the [employer] and is living in [facility].  He did meet with neurology in regards to C5 herniation, question of depression.  Did meet with psychiatry, started on wellbutrin XL.  Weight stable.  He is somewhat vague on his exact sleep pattern, but reports getting 8+ hours of sleep.  Negative narcolepsy triad.  He is also scheduled for upcoming surgery with ENT to replace inner ear bones.  A/P to attempt to get coverage for modafinil 100-200mg PO QAM and continue adderall IR 20mg BID to TID PRN.     10/23/2020 - He presents for a virtual phone follow-up visit.  He has continued to follow with psychiatry.  He has recently discontinued wellbutrin XL due to report of suicidal thinking, transitioned to fluoxetine 40mg every day. " "Did have resolution of suicidal ideation with stop of wellbutrin.     He feels his sleepiness is still an issue and last week fell asleep at a stop (works as [employer]) for ~2 hours and was awoken by police.  This occurred ~6pm.     In bed most nights between 8-10pm and usually falls asleep quickly.  Seems to deny frequent or prolonged awakenings.  He is vague on his wake up time.  Estimates total sleep time 8-10 hours.     He feels the Adderall has not had significant benefit, but does feel the modafinil continues to be beneficial.  Currently taking modafinil 200mg PO QAM, adderall IR 20mg up to TID (often upon awakening, ~9am, ~1pm).     A/P to stop adderall, continue modafinil and increase to 200mg BID.     10/14/2021 - Overall, he feels his sleepiness is stable with the modafinil 200mg BID.  He feels it would be helpful to have a small amount of adderall to take as needed.  He is working on evaluation for either ADHD or possible autism spectrum disorder.  A/P for modafinil 200mg BID and adderall IR 20mg every day PRN.    Today -this visit was initially Todd, though his wife joined us later about [facility] through.  He feels that he continues to have a significant daytime sleepiness, though his pattern has been somewhat irregular given that he is now unemployed for the past 2-3 months appears to have minimal constraints at this time.  He finds that he will fall asleep in activities where he is otherwise engaged, including playing video games.  He also shares one time when he had gotten up to get a drink of water, he was walking in the kitchen and felt a \"wave of tiredness\", he fell and did hit his eyebrow on a cabinet.  He does not believe there is any loss of consciousness and he feels like he had fallen asleep briefly.    As noted above, he has had increased rest since being laid off from work.  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    Since last visit he did not start Adderall.  This appeared to be due to some either coverage or prescription concern that he did not pick it up in a certain amount of time.    We also reviewed this reported history of a brain tumor, he clarifies that this was a cholesteatoma that was removed when he was roughly 17 years old.    When asked what time he goes to bed, he is open that this is very irregular and unclear to him.  But he states that it is well for around midnight that he will fall asleep quickly.  He denies any frequent or prolonged awakenings.  He feels he will awaken actually closer to 1 PM.  He has tried to let himself sleep ad josselyn. over the last 2-3 months.    He feels that the modafinil has continued to have some benefit and that his symptoms are worse when not taking it.    After his wife joined the visit, she did note that he often seems to have some abrupt startle-like movements during his sleep, and that he is also snoring.  She had inquired after reading about his Xywav being approved for idiopathic hypersomnia.      Insomnia Severity Index    Difficulty falling asleep 3    Difficulty staying asleep 1    Problems waking up too early 1    How SATISFIED/DISSATISFIED are you with your CURRENT sleep pattern? 2    How NOTICEABLE to others do you think your sleep problem is in terms of impairing the quality of your life? 4    How WORRIED/DISTRESSED are you about your current sleep problem? 2    To what extent do you consider your sleep problem to INTERFERE with your daily functioning (e.g. daytime fatigue, mood, ability to function at work/daily chores, concentration, memory, mood, etc.) CURRENTLY? 4    Total Score 17        Past medical history:    Patient Active Problem List    Diagnosis Date Noted     Idiopathic hypersomnia with long sleep time 09/08/2017     Priority: Medium     Conductive hearing loss in right ear 03/23/2017     Priority:  Medium     Mixed hearing loss of left ear 03/23/2017     Priority: Medium     Chronic low back pain 06/28/2016     Priority: Medium     Last Assessment & Plan:   A: Patient with history of acute on chronic low back pain that has been worse since Monday after injury at work. Currently taking Medrol Dosepak and tramadol for pain management prescribed by the ED. Notes ongoing pain today. No red flags or acutely concerning symptoms at this time. Physical exam shows lower lumbar tenderness to palpation. Discussed that pain is likely related to inflammation of a spine disk and would anticipate improvement with finishing medrol dosepak and taking NSAIDs. Patient expressed an understanding of this.    P:  - Continue Medrol Dosepak and Tramadol as prescribed  - Refilled Tramadol #20 QID PRN, advised patient to not to drive while taking this medication  - Recommended ibuprofen 600 mg QID PRN   - Work note provided to patient   - Advised patient to avoid heavy lifting  - Follow-up in 1 month, sooner if pain persists/worsens and can consider imaging at that time       Health care maintenance 06/28/2016     Priority: Medium     Last Assessment & Plan:   A/P: Due for HPV vaccine series, but declines this today. He is otherwise UTD on vaccines.         10 point ROS of systems including Constitutional, Eyes, Respiratory, Cardiovascular, Gastroenterology, Genitourinary, Integumentary, Muscularskeletal, Psychiatric were all negative except for pertinent positives noted in my HPI.    Current Outpatient Medications   Medication Sig Dispense Refill     amphetamine-dextroamphetamine (ADDERALL) 20 MG tablet Take 1 tablet by mouth once daily as needed for sleepiness. 30 tablet 0     modafinil (PROVIGIL) 200 MG tablet Take 1 tablet (200 mg) by mouth 2 times daily 60 tablet 5       OBJECTIVE:  There were no vitals taken for this visit.    Physical Exam     ---  This note was written with the assistance of the Dragon voice-dictation  technology software. The final document, although reviewed, may contain errors. For corrections, please contact the office.    Atul Abraham MD    Sleep Medicine  [facility]  ([phone])  [facility]  ([phone])    Time spent on the date of service:  35 minutes.    Answers for HPI/ROS submitted by the patient on 2/24/2022  If you checked off any problems, how difficult have these problems made it for you to do your work, take care of things at home, or get along with other people?: Extremely difficult  PHQ9 TOTAL SCORE: 16

## 2022-02-25 ASSESSMENT — PATIENT HEALTH QUESTIONNAIRE - PHQ9: SUM OF ALL RESPONSES TO PHQ QUESTIONS 1-9: 16

## 2022-03-07 ENCOUNTER — LAB (OUTPATIENT)
Dept: LAB | Facility: CLINIC | Age: 32
End: 2022-03-07
Attending: OBSTETRICS & GYNECOLOGY
Payer: COMMERCIAL

## 2022-03-07 DIAGNOSIS — Z31.41 ENCOUNTER FOR FERTILITY TESTING: ICD-10-CM

## 2022-03-07 LAB
ABNORMAL SPERM MORPHOLOGY: 92
ABSTINENCE DAYS: 3 DAYS (ref 2–7)
AGGLUTINATION: NO
ANALYSIS TEMP - CENTIGRADE: 23 CENTIGRADE
COLLECTION METHOD: NORMAL
COLLECTION SITE: NORMAL
CONSENT TO RELEASE TO PARTNER: NO
DAL- RECEIVED TIME: NORMAL
HEAD DEFECT: 92 %
IMMOTILE: 6 %
LIQUEFIED: YES
MIDPIECE DEFECT: 30 %
NON-PROGRESSIVE MOTILITY: 2 %
NORMAL SPERM MORPHOLOGY: 8 % NORMAL FORMS
PROGRESSIVE MOTILITY: 92 %
ROUND CELLS: 0.3 MILLION/ML
SPECIMEN PH: 7.6 PH
SPECIMEN VOLUME: 2.2 ML
SPERM CONCENTRATION: 141 MILLION/ML
TAIL DEFECT: 0 %
TIME OF ANALYSIS: NORMAL
TOTAL PROGRESSIVE MOTILE NUMBER: 285 MILLION
TOTAL SPERM NUMBER: 310 MILLION
VISCOUS: NO
VITALITY: NORMAL

## 2022-03-07 PROCEDURE — 89322 SEMEN ANAL STRICT CRITERIA: CPT

## 2022-03-30 DIAGNOSIS — G47.11 IDIOPATHIC HYPERSOMNIA WITH LONG SLEEP TIME: ICD-10-CM

## 2022-03-30 RX ORDER — MODAFINIL 200 MG/1
200 TABLET ORAL 2 TIMES DAILY
Qty: 60 TABLET | Refills: 5 | Status: SHIPPED | OUTPATIENT
Start: 2022-03-30 | End: 2022-10-07

## 2022-03-30 NOTE — TELEPHONE ENCOUNTER
Pending Prescriptions:                       Disp   Refills    modafinil (PROVIGIL) 200 MG tablet        60 tab*5            Sig: Take 1 tablet (200 mg) by mouth 2 times daily      Last Written Prescription Date:  10/14/2021  Last Fill Quantity: 60,   # refills: 5  Last Office Visit with G, [facility] or [facility] prescribing provider: 2/24/22  Future Office visit:   No follow up scheduled at this time.      LUCAS Boone

## 2022-09-03 ENCOUNTER — MYC REFILL (OUTPATIENT)
Dept: SLEEP MEDICINE | Facility: CLINIC | Age: 32
End: 2022-09-03

## 2022-09-03 DIAGNOSIS — G47.11 IDIOPATHIC HYPERSOMNIA WITH LONG SLEEP TIME: ICD-10-CM

## 2022-09-06 RX ORDER — DEXTROAMPHETAMINE SACCHARATE, AMPHETAMINE ASPARTATE, DEXTROAMPHETAMINE SULFATE AND AMPHETAMINE SULFATE 5; 5; 5; 5 MG/1; MG/1; MG/1; MG/1
TABLET ORAL
Qty: 30 TABLET | Refills: 0 | Status: SHIPPED | OUTPATIENT
Start: 2022-09-06 | End: 2023-01-13

## 2022-10-04 DIAGNOSIS — G47.11 IDIOPATHIC HYPERSOMNIA WITH LONG SLEEP TIME: ICD-10-CM

## 2022-10-07 ENCOUNTER — TELEPHONE (OUTPATIENT)
Dept: PULMONOLOGY | Facility: OTHER | Age: 32
End: 2022-10-07

## 2022-10-07 RX ORDER — MODAFINIL 200 MG/1
200 TABLET ORAL 2 TIMES DAILY
Qty: 60 TABLET | Refills: 5 | Status: SHIPPED | OUTPATIENT
Start: 2022-10-07 | End: 2023-05-15

## 2022-10-07 NOTE — TELEPHONE ENCOUNTER
This writer spoke with pharmacy.  Pharmacy allowed to give 4 refills on script. Verbal given for 4 refills.

## 2022-11-19 ENCOUNTER — HEALTH MAINTENANCE LETTER (OUTPATIENT)
Age: 32
End: 2022-11-19

## 2023-04-09 ENCOUNTER — HEALTH MAINTENANCE LETTER (OUTPATIENT)
Age: 33
End: 2023-04-09

## 2023-05-15 DIAGNOSIS — G47.11 IDIOPATHIC HYPERSOMNIA WITH LONG SLEEP TIME: ICD-10-CM

## 2023-05-15 RX ORDER — MODAFINIL 200 MG/1
200 TABLET ORAL 2 TIMES DAILY
Qty: 60 TABLET | Refills: 1 | Status: SHIPPED | OUTPATIENT
Start: 2023-05-15

## 2023-05-19 DIAGNOSIS — G47.11 IDIOPATHIC HYPERSOMNIA WITH LONG SLEEP TIME: ICD-10-CM

## 2023-05-19 RX ORDER — MODAFINIL 200 MG/1
200 TABLET ORAL 2 TIMES DAILY
Qty: 60 TABLET | Refills: 1 | OUTPATIENT
Start: 2023-05-19

## 2023-10-12 NOTE — TELEPHONE ENCOUNTER
[email or web address]    Patient called RN triage for refill request.  Patient states pharmacy is [facility] in [place].  Please call patient when sent in.  Thank you.  Angelina Lazaro RN  [facility] Nurse Advisor     Detail Level: Detailed Render Note In Bullet Format When Appropriate: No Medical Necessity Information: It is in your best interest to select a reason for this procedure from the list below. All of these items fulfill various CMS LCD requirements except the new and changing color options. Post-Care Instructions: I reviewed with the patient in detail post-care instructions. Patient is to wear sunprotection, and avoid picking at any of the treated lesions. Pt may apply Vaseline to crusted or scabbing areas. Treatment Number (Will Not Render If 0): 0 Duration Of Freeze Thaw-Cycle (Seconds): 5-10 Medical Necessity Clause: This procedure was medically necessary because the lesions that were treated were: Number Of Freeze-Thaw Cycles: 3 freeze-thaw cycles Consent: The patient's consent was obtained including but not limited to risks of crusting, scabbing, blistering, scarring, darker or lighter pigmentary change, recurrence, incomplete removal and infection.

## 2024-06-16 ENCOUNTER — HEALTH MAINTENANCE LETTER (OUTPATIENT)
Age: 34
End: 2024-06-16